# Patient Record
Sex: FEMALE | Race: WHITE | NOT HISPANIC OR LATINO
[De-identification: names, ages, dates, MRNs, and addresses within clinical notes are randomized per-mention and may not be internally consistent; named-entity substitution may affect disease eponyms.]

---

## 2017-03-20 ENCOUNTER — APPOINTMENT (OUTPATIENT)
Dept: UROLOGY | Facility: CLINIC | Age: 37
End: 2017-03-20

## 2018-03-12 ENCOUNTER — APPOINTMENT (OUTPATIENT)
Dept: HEMATOLOGY ONCOLOGY | Facility: CLINIC | Age: 38
End: 2018-03-12

## 2018-03-22 ENCOUNTER — APPOINTMENT (OUTPATIENT)
Dept: HEMATOLOGY ONCOLOGY | Facility: CLINIC | Age: 38
End: 2018-03-22

## 2018-03-22 VITALS
HEIGHT: 66 IN | HEART RATE: 71 BPM | SYSTOLIC BLOOD PRESSURE: 101 MMHG | RESPIRATION RATE: 14 BRPM | TEMPERATURE: 97.4 F | WEIGHT: 190 LBS | BODY MASS INDEX: 30.53 KG/M2 | DIASTOLIC BLOOD PRESSURE: 64 MMHG

## 2018-03-22 DIAGNOSIS — Z78.9 OTHER SPECIFIED HEALTH STATUS: ICD-10-CM

## 2018-03-22 DIAGNOSIS — Z87.19 PERSONAL HISTORY OF OTHER DISEASES OF THE DIGESTIVE SYSTEM: ICD-10-CM

## 2018-03-23 LAB
FERRITIN SERPL-MCNC: 6 NG/ML
TSH SERPL-ACNC: 1.99 UIU/ML

## 2018-03-26 ENCOUNTER — APPOINTMENT (OUTPATIENT)
Dept: INFUSION THERAPY | Facility: CLINIC | Age: 38
End: 2018-03-26

## 2018-03-26 LAB
ENDOMYSIUM IGA SER QL: NEGATIVE
ENDOMYSIUM IGA TITR SER: NORMAL
GASTRIN SERPL-MCNC: 27 PG/ML
GLIADIN IGA SER QL: <5 UNITS
GLIADIN IGG SER QL: 36.3 UNITS
GLIADIN PEPTIDE IGA SER-ACNC: NEGATIVE
GLIADIN PEPTIDE IGG SER-ACNC: POSITIVE

## 2018-03-26 RX ORDER — IRON SUCROSE 20 MG/ML
200 INJECTION, SOLUTION INTRAVENOUS ONCE
Qty: 0 | Refills: 0 | Status: COMPLETED | OUTPATIENT
Start: 2018-03-26 | End: 2018-03-26

## 2018-03-26 RX ADMIN — IRON SUCROSE 220 MILLIGRAM(S): 20 INJECTION, SOLUTION INTRAVENOUS at 11:20

## 2018-04-02 ENCOUNTER — APPOINTMENT (OUTPATIENT)
Dept: INFUSION THERAPY | Facility: CLINIC | Age: 38
End: 2018-04-02

## 2018-04-02 RX ORDER — IRON SUCROSE 20 MG/ML
200 INJECTION, SOLUTION INTRAVENOUS ONCE
Qty: 0 | Refills: 0 | Status: COMPLETED | OUTPATIENT
Start: 2018-04-02 | End: 2018-04-02

## 2018-04-02 RX ADMIN — IRON SUCROSE 220 MILLIGRAM(S): 20 INJECTION, SOLUTION INTRAVENOUS at 10:07

## 2018-04-09 ENCOUNTER — APPOINTMENT (OUTPATIENT)
Dept: INFUSION THERAPY | Facility: CLINIC | Age: 38
End: 2018-04-09

## 2018-04-09 RX ORDER — IRON SUCROSE 20 MG/ML
200 INJECTION, SOLUTION INTRAVENOUS ONCE
Qty: 0 | Refills: 0 | Status: COMPLETED | OUTPATIENT
Start: 2018-04-09 | End: 2018-04-09

## 2018-04-09 RX ADMIN — IRON SUCROSE 220 MILLIGRAM(S): 20 INJECTION, SOLUTION INTRAVENOUS at 10:21

## 2018-04-16 ENCOUNTER — APPOINTMENT (OUTPATIENT)
Dept: INFUSION THERAPY | Facility: CLINIC | Age: 38
End: 2018-04-16

## 2018-04-16 RX ORDER — IRON SUCROSE 20 MG/ML
200 INJECTION, SOLUTION INTRAVENOUS ONCE
Qty: 0 | Refills: 0 | Status: COMPLETED | OUTPATIENT
Start: 2018-04-16 | End: 2018-04-16

## 2018-04-16 RX ADMIN — IRON SUCROSE 220 MILLIGRAM(S): 20 INJECTION, SOLUTION INTRAVENOUS at 11:04

## 2018-04-23 ENCOUNTER — APPOINTMENT (OUTPATIENT)
Dept: INFUSION THERAPY | Facility: CLINIC | Age: 38
End: 2018-04-23

## 2018-04-23 RX ORDER — IRON SUCROSE 20 MG/ML
200 INJECTION, SOLUTION INTRAVENOUS ONCE
Qty: 0 | Refills: 0 | Status: COMPLETED | OUTPATIENT
Start: 2018-04-23 | End: 2018-04-23

## 2018-04-23 RX ADMIN — IRON SUCROSE 220 MILLIGRAM(S): 20 INJECTION, SOLUTION INTRAVENOUS at 10:42

## 2018-04-30 ENCOUNTER — APPOINTMENT (OUTPATIENT)
Dept: HEMATOLOGY ONCOLOGY | Facility: CLINIC | Age: 38
End: 2018-04-30

## 2018-04-30 VITALS
BODY MASS INDEX: 30.53 KG/M2 | WEIGHT: 190 LBS | DIASTOLIC BLOOD PRESSURE: 72 MMHG | HEIGHT: 66 IN | RESPIRATION RATE: 14 BRPM | HEART RATE: 70 BPM | TEMPERATURE: 97.5 F | SYSTOLIC BLOOD PRESSURE: 128 MMHG

## 2018-06-18 ENCOUNTER — APPOINTMENT (OUTPATIENT)
Dept: HEMATOLOGY ONCOLOGY | Facility: CLINIC | Age: 38
End: 2018-06-18

## 2018-06-18 ENCOUNTER — LABORATORY RESULT (OUTPATIENT)
Age: 38
End: 2018-06-18

## 2018-06-18 LAB
HCT VFR BLD CALC: 36.3 %
HGB BLD-MCNC: 12.2 G/DL
MCHC RBC-ENTMCNC: 29.2 PG
MCHC RBC-ENTMCNC: 33.6 G/DL
MCV RBC AUTO: 86.8 FL
PLATELET # BLD AUTO: 216 K/UL
PMV BLD: 8.7 FL
RBC # BLD: 4.18 M/UL
RBC # FLD: 15.5 %
WBC # FLD AUTO: 7.85 K/UL

## 2018-06-20 LAB — FERRITIN SERPL-MCNC: 41 NG/ML

## 2018-06-25 ENCOUNTER — APPOINTMENT (OUTPATIENT)
Dept: HEMATOLOGY ONCOLOGY | Facility: CLINIC | Age: 38
End: 2018-06-25

## 2018-06-25 ENCOUNTER — LABORATORY RESULT (OUTPATIENT)
Age: 38
End: 2018-06-25

## 2018-06-25 ENCOUNTER — APPOINTMENT (OUTPATIENT)
Dept: INFUSION THERAPY | Facility: CLINIC | Age: 38
End: 2018-06-25

## 2018-06-25 VITALS
SYSTOLIC BLOOD PRESSURE: 119 MMHG | TEMPERATURE: 97.6 F | BODY MASS INDEX: 31.82 KG/M2 | DIASTOLIC BLOOD PRESSURE: 83 MMHG | WEIGHT: 198 LBS | HEART RATE: 67 BPM | HEIGHT: 66 IN

## 2018-06-25 LAB
HCT VFR BLD CALC: 37.6 %
HGB BLD-MCNC: 12.7 G/DL
MCHC RBC-ENTMCNC: 29.7 PG
MCHC RBC-ENTMCNC: 33.8 G/DL
MCV RBC AUTO: 87.9 FL
PLATELET # BLD AUTO: 222 K/UL
PMV BLD: 8.6 FL
RBC # BLD: 4.28 M/UL
RBC # FLD: 15.2 %
WBC # FLD AUTO: 6.24 K/UL

## 2018-06-25 RX ORDER — IRON SUCROSE 20 MG/ML
200 INJECTION, SOLUTION INTRAVENOUS ONCE
Qty: 0 | Refills: 0 | Status: COMPLETED | OUTPATIENT
Start: 2018-06-25 | End: 2018-06-25

## 2018-06-25 RX ADMIN — IRON SUCROSE 220 MILLIGRAM(S): 20 INJECTION, SOLUTION INTRAVENOUS at 10:29

## 2018-06-27 LAB — FERRITIN SERPL-MCNC: 37 NG/ML

## 2018-08-31 ENCOUNTER — OUTPATIENT (OUTPATIENT)
Dept: OUTPATIENT SERVICES | Facility: HOSPITAL | Age: 38
LOS: 1 days | Discharge: HOME | End: 2018-08-31

## 2018-08-31 ENCOUNTER — APPOINTMENT (OUTPATIENT)
Dept: INFUSION THERAPY | Facility: CLINIC | Age: 38
End: 2018-08-31

## 2018-08-31 DIAGNOSIS — D50.9 IRON DEFICIENCY ANEMIA, UNSPECIFIED: ICD-10-CM

## 2018-08-31 RX ORDER — IRON SUCROSE 20 MG/ML
200 INJECTION, SOLUTION INTRAVENOUS ONCE
Qty: 0 | Refills: 0 | Status: COMPLETED | OUTPATIENT
Start: 2018-08-31 | End: 2018-08-31

## 2018-08-31 RX ADMIN — IRON SUCROSE 220 MILLIGRAM(S): 20 INJECTION, SOLUTION INTRAVENOUS at 09:33

## 2018-09-27 ENCOUNTER — LABORATORY RESULT (OUTPATIENT)
Age: 38
End: 2018-09-27

## 2018-09-27 ENCOUNTER — APPOINTMENT (OUTPATIENT)
Dept: HEMATOLOGY ONCOLOGY | Facility: CLINIC | Age: 38
End: 2018-09-27

## 2018-09-27 ENCOUNTER — APPOINTMENT (OUTPATIENT)
Dept: INFUSION THERAPY | Facility: CLINIC | Age: 38
End: 2018-09-27

## 2018-09-27 LAB
HCT VFR BLD CALC: 36.4 %
HGB BLD-MCNC: 12.5 G/DL
MCHC RBC-ENTMCNC: 31.2 PG
MCHC RBC-ENTMCNC: 34.3 G/DL
MCV RBC AUTO: 90.8 FL
PLATELET # BLD AUTO: 217 K/UL
PMV BLD: 8.6 FL
RBC # BLD: 4.01 M/UL
RBC # FLD: 12.2 %
WBC # FLD AUTO: 7.97 K/UL

## 2018-09-30 LAB
FERRITIN SERPL-MCNC: 68 NG/ML
IRON SATN MFR SERPL: 26 %
IRON SERPL-MCNC: 74 UG/DL
TIBC SERPL-MCNC: 285 UG/DL
UIBC SERPL-MCNC: 211 UG/DL

## 2018-10-01 ENCOUNTER — APPOINTMENT (OUTPATIENT)
Dept: HEMATOLOGY ONCOLOGY | Facility: CLINIC | Age: 38
End: 2018-10-01

## 2018-10-11 ENCOUNTER — APPOINTMENT (OUTPATIENT)
Dept: HEMATOLOGY ONCOLOGY | Facility: CLINIC | Age: 38
End: 2018-10-11

## 2018-10-11 VITALS
BODY MASS INDEX: 30.53 KG/M2 | DIASTOLIC BLOOD PRESSURE: 67 MMHG | TEMPERATURE: 96.1 F | HEIGHT: 66 IN | RESPIRATION RATE: 14 BRPM | HEART RATE: 67 BPM | SYSTOLIC BLOOD PRESSURE: 113 MMHG | WEIGHT: 190 LBS

## 2018-10-26 ENCOUNTER — APPOINTMENT (OUTPATIENT)
Dept: INFUSION THERAPY | Facility: CLINIC | Age: 38
End: 2018-10-26

## 2018-10-29 ENCOUNTER — APPOINTMENT (OUTPATIENT)
Dept: HEMATOLOGY ONCOLOGY | Facility: CLINIC | Age: 38
End: 2018-10-29

## 2018-11-23 ENCOUNTER — APPOINTMENT (OUTPATIENT)
Dept: INFUSION THERAPY | Facility: CLINIC | Age: 38
End: 2018-11-23

## 2018-11-23 RX ORDER — IRON SUCROSE 20 MG/ML
200 INJECTION, SOLUTION INTRAVENOUS ONCE
Qty: 0 | Refills: 0 | Status: COMPLETED | OUTPATIENT
Start: 2018-11-23 | End: 2018-11-23

## 2018-11-23 RX ADMIN — IRON SUCROSE 220 MILLIGRAM(S): 20 INJECTION, SOLUTION INTRAVENOUS at 08:31

## 2018-12-17 ENCOUNTER — APPOINTMENT (OUTPATIENT)
Dept: HEMATOLOGY ONCOLOGY | Facility: CLINIC | Age: 38
End: 2018-12-17

## 2019-02-15 ENCOUNTER — APPOINTMENT (OUTPATIENT)
Dept: INFUSION THERAPY | Facility: CLINIC | Age: 39
End: 2019-02-15

## 2019-02-15 ENCOUNTER — LABORATORY RESULT (OUTPATIENT)
Age: 39
End: 2019-02-15

## 2019-02-15 ENCOUNTER — OUTPATIENT (OUTPATIENT)
Dept: OUTPATIENT SERVICES | Facility: HOSPITAL | Age: 39
LOS: 1 days | Discharge: HOME | End: 2019-02-15

## 2019-02-15 DIAGNOSIS — D50.9 IRON DEFICIENCY ANEMIA, UNSPECIFIED: ICD-10-CM

## 2019-02-15 RX ORDER — IRON SUCROSE 20 MG/ML
200 INJECTION, SOLUTION INTRAVENOUS ONCE
Qty: 0 | Refills: 0 | Status: COMPLETED | OUTPATIENT
Start: 2019-02-15 | End: 2019-02-15

## 2019-02-15 RX ADMIN — IRON SUCROSE 220 MILLIGRAM(S): 20 INJECTION, SOLUTION INTRAVENOUS at 09:10

## 2019-02-19 LAB
FERRITIN SERPL-MCNC: 24 NG/ML
HCT VFR BLD CALC: 33.4 %
HGB BLD-MCNC: 11.4 G/DL
IRON SATN MFR SERPL: 14 %
IRON SERPL-MCNC: 46 UG/DL
MCHC RBC-ENTMCNC: 31.4 PG
MCHC RBC-ENTMCNC: 34.1 G/DL
MCV RBC AUTO: 92 FL
PLATELET # BLD AUTO: 221 K/UL
PMV BLD: 10 FL
RBC # BLD: 3.63 M/UL
RBC # FLD: 12.5 %
TIBC SERPL-MCNC: 322 UG/DL
UIBC SERPL-MCNC: 276 UG/DL
WBC # FLD AUTO: 7.77 K/UL

## 2019-03-05 ENCOUNTER — LABORATORY RESULT (OUTPATIENT)
Age: 39
End: 2019-03-05

## 2019-03-05 ENCOUNTER — APPOINTMENT (OUTPATIENT)
Dept: HEMATOLOGY ONCOLOGY | Facility: CLINIC | Age: 39
End: 2019-03-05

## 2019-03-05 VITALS
BODY MASS INDEX: 32.14 KG/M2 | DIASTOLIC BLOOD PRESSURE: 79 MMHG | SYSTOLIC BLOOD PRESSURE: 120 MMHG | WEIGHT: 200 LBS | HEIGHT: 66 IN | TEMPERATURE: 97.8 F | HEART RATE: 73 BPM | RESPIRATION RATE: 14 BRPM

## 2019-03-05 LAB
HCT VFR BLD CALC: 36.8 %
HGB BLD-MCNC: 12.5 G/DL
MCHC RBC-ENTMCNC: 31.1 PG
MCHC RBC-ENTMCNC: 34 G/DL
MCV RBC AUTO: 91.5 FL
PLATELET # BLD AUTO: 237 K/UL
PMV BLD: 9 FL
RBC # BLD: 4.02 M/UL
RBC # FLD: 12.2 %
WBC # FLD AUTO: 8.13 K/UL

## 2019-03-05 NOTE — PHYSICAL EXAM
[Normal] : normoactive bowel sounds, soft and nontender, no hepatosplenomegaly or masses appreciated
5

## 2019-03-06 LAB
ALBUMIN SERPL ELPH-MCNC: 4.2 G/DL
ALP BLD-CCNC: 62 U/L
ALT SERPL-CCNC: 30 U/L
ANION GAP SERPL CALC-SCNC: 12 MMOL/L
AST SERPL-CCNC: 25 U/L
BILIRUB SERPL-MCNC: 0.8 MG/DL
BUN SERPL-MCNC: 12 MG/DL
CALCIUM SERPL-MCNC: 9 MG/DL
CHLORIDE SERPL-SCNC: 104 MMOL/L
CO2 SERPL-SCNC: 22 MMOL/L
CREAT SERPL-MCNC: 0.8 MG/DL
FERRITIN SERPL-MCNC: 84 NG/ML
GLUCOSE SERPL-MCNC: 79 MG/DL
IRON SATN MFR SERPL: 40 %
IRON SERPL-MCNC: 122 UG/DL
POTASSIUM SERPL-SCNC: 4.5 MMOL/L
PROT SERPL-MCNC: 7.1 G/DL
SODIUM SERPL-SCNC: 138 MMOL/L
TIBC SERPL-MCNC: 306 UG/DL
UIBC SERPL-MCNC: 184 UG/DL

## 2019-03-06 NOTE — HISTORY OF PRESENT ILLNESS
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 ferritin : 5 .  she reports constipation from iron  . she feels fatigued and with mild dizziness. no SOB or craving for ice. No hematochezia. last EGD in January 2018   , colonoscopy ( result ?)  within 12 months. she denies heavy menses . [de-identified] : 06/25/2018 : Patient returns for follow up , she feels well , last Hb is normal , Ferritin :41 . She continues with heavy menses. \par \par 10/11/2018 : Patient returns for follow up , she reports occasional dizziness, she has less bleeding with menses. Last ferritin is up to 67 . Hb is normal . EGD was negative for celiac disease. \par \par 3/5/19:\par Doing well. No major complaints.\par Denies fever, nausea, vomiting, chest pain, SOB, abdominal pain, bowel and bladder problems.\par Not on PO iron.\par GI ()- has early barretts. Last endoscopy in 2018. No celiac disease on endoscopy as per pt. \par Normal menstrual cycles. \par

## 2019-03-06 NOTE — ASSESSMENT
[FreeTextEntry1] : \par \par 1) Iron deficiency anemia- s/p Iv iron - venofer :1000mg \par Not able to tolerate oral iron/ No improvement with Po iron therapy\par Improved Hb and ferritin\par Gliadin IgG positive, Normal TSH, denies steatorrhea, weight loss. EGD negative , \par \par follow up in 2 months with repeat cbc ferritin. will need maintenance venofer.

## 2019-03-18 ENCOUNTER — OUTPATIENT (OUTPATIENT)
Dept: OUTPATIENT SERVICES | Facility: HOSPITAL | Age: 39
LOS: 1 days | Discharge: HOME | End: 2019-03-18

## 2019-03-18 ENCOUNTER — TRANSCRIPTION ENCOUNTER (OUTPATIENT)
Age: 39
End: 2019-03-18

## 2019-03-18 DIAGNOSIS — Z00.00 ENCOUNTER FOR GENERAL ADULT MEDICAL EXAMINATION WITHOUT ABNORMAL FINDINGS: ICD-10-CM

## 2019-04-21 ENCOUNTER — EMERGENCY (EMERGENCY)
Facility: HOSPITAL | Age: 39
LOS: 0 days | Discharge: HOME | End: 2019-04-21
Attending: STUDENT IN AN ORGANIZED HEALTH CARE EDUCATION/TRAINING PROGRAM | Admitting: STUDENT IN AN ORGANIZED HEALTH CARE EDUCATION/TRAINING PROGRAM
Payer: COMMERCIAL

## 2019-04-21 VITALS
DIASTOLIC BLOOD PRESSURE: 112 MMHG | OXYGEN SATURATION: 98 % | HEART RATE: 69 BPM | TEMPERATURE: 98 F | RESPIRATION RATE: 18 BRPM

## 2019-04-21 VITALS — DIASTOLIC BLOOD PRESSURE: 67 MMHG | WEIGHT: 192.02 LBS | SYSTOLIC BLOOD PRESSURE: 112 MMHG | HEIGHT: 65 IN

## 2019-04-21 DIAGNOSIS — R51 HEADACHE: ICD-10-CM

## 2019-04-21 DIAGNOSIS — Z90.49 ACQUIRED ABSENCE OF OTHER SPECIFIED PARTS OF DIGESTIVE TRACT: ICD-10-CM

## 2019-04-21 DIAGNOSIS — Z87.891 PERSONAL HISTORY OF NICOTINE DEPENDENCE: ICD-10-CM

## 2019-04-21 DIAGNOSIS — K21.9 GASTRO-ESOPHAGEAL REFLUX DISEASE WITHOUT ESOPHAGITIS: ICD-10-CM

## 2019-04-21 DIAGNOSIS — Z90.49 ACQUIRED ABSENCE OF OTHER SPECIFIED PARTS OF DIGESTIVE TRACT: Chronic | ICD-10-CM

## 2019-04-21 DIAGNOSIS — Z98.891 HISTORY OF UTERINE SCAR FROM PREVIOUS SURGERY: Chronic | ICD-10-CM

## 2019-04-21 DIAGNOSIS — Z98.891 HISTORY OF UTERINE SCAR FROM PREVIOUS SURGERY: ICD-10-CM

## 2019-04-21 PROCEDURE — 99283 EMERGENCY DEPT VISIT LOW MDM: CPT | Mod: 25

## 2019-04-21 RX ORDER — RANITIDINE HYDROCHLORIDE 150 MG/1
0 TABLET, FILM COATED ORAL
Qty: 0 | Refills: 0 | COMMUNITY

## 2019-04-21 RX ORDER — OMEPRAZOLE 10 MG/1
0 CAPSULE, DELAYED RELEASE ORAL
Qty: 0 | Refills: 0 | COMMUNITY

## 2019-04-21 RX ORDER — VALACYCLOVIR 500 MG/1
2 TABLET, FILM COATED ORAL
Qty: 42 | Refills: 0 | OUTPATIENT
Start: 2019-04-21 | End: 2019-04-27

## 2019-04-21 RX ORDER — IRON DEXTRAN COMPLEX 100 MG/2ML
0 VIAL (ML) INJECTION
Qty: 0 | Refills: 0 | COMMUNITY

## 2019-04-21 NOTE — ED ADULT NURSE NOTE - PMH
GERD (gastroesophageal reflux disease)    Iron deficiency anemia, unspecified iron deficiency anemia type

## 2019-04-21 NOTE — ED PROVIDER NOTE - NS ED ROS FT
Constitutional:  See HPI  Eyes:  no visual changes, see HPI  ENMT: No neck pain or stiffness  Cardiac:  No chest pain  Respiratory:  No cough or respiratory distress.   GI:  No nausea, vomiting, diarrhea or abdominal pain.  :  No dysuria, frequency or burning.  MS:  No back pain.  Neuro:  See HPI   Skin:  No skin rash  Except as documented in the HPI,  all other systems are negative

## 2019-04-21 NOTE — ED PROVIDER NOTE - PHYSICAL EXAMINATION
CONSTITUTIONAL: NAD  SKIN: Warm dry  HEAD: NCAT  EYES: + mild swelling to upper eyelid w/o erythema; PERRL; EOMI; + mild scleral injection; Fluroceine stain no uptake  ENT: + herpetic type lesion on R lower lip; MMM; no nasal or ear lesions/ rash  NECK: Supple; non tender.  CARD: RRR  RESP: CTAB  ABD: S/NT no R/G  EXT: no pedal edema  NEURO: CN wnl; NL motor and sensory throughout  PSYCH: Cooperative, appropriate.

## 2019-04-21 NOTE — ED ADULT TRIAGE NOTE - CHIEF COMPLAINT QUOTE
woke up with swollen face and droop on the right side Thursday night, has hx of migraines denies numbness or tingling

## 2019-04-21 NOTE — ED ADULT NURSE NOTE - OBJECTIVE STATEMENT
patient presents for evaluation of right facial droop, right eye discomfort with lateral movement and right facial swelling x 3 days. noted with cold sore to right lower lip

## 2019-04-21 NOTE — ED PROVIDER NOTE - OBJECTIVE STATEMENT
37 y/o F pmh pmh GERD, anemia, here for eval R facial gradual onset, aching/ sharp, constant, non radiating pain x3d. + some eye lid swelling. + lip cold sore. + R sided pounding HA 3d ago, resolved after 2d. No fever, weakness, numbness, recent illness, travel, trauma, blurry vision, eye discharge

## 2019-04-21 NOTE — ED PROVIDER NOTE - CLINICAL SUMMARY MEDICAL DECISION MAKING FREE TEXT BOX
mm Pt w/ L facial pain. Ssx suggest zoster. diagnostic uncertainty discussed w/ pt, recc start acyclovir, pt agreed. understood need for fup and ssx for ED return. Pt stable for dc w/ PMD f/up, and care as discussed.  Pt/ family understands plan and signs and symptoms for ED return.

## 2019-04-21 NOTE — ED PROVIDER NOTE - NSFOLLOWUPINSTRUCTIONS_ED_ALL_ED_FT
Shingles    You were seen for facial pain. It may be shingles.  This diagnosis is uncertain, but you are receiving treatment for it.  Follow up with your doctor in 4-6 days if your symptoms do not improve.  For worsening or new symptoms, return the ER.    Shingles, which is also known as herpes zoster, is an infection that causes a painful skin rash and fluid-filled blisters. Shingles is not related to genital herpes, which is a sexually transmitted infection.     Shingles only develops in people who:    Have had chickenpox.  Have received the chickenpox vaccine. (This is rare.)    CAUSES  Shingles is caused by varicella-zoster virus (VZV). This is the same virus that causes chickenpox. After exposure to VZV, the virus stays in the body in an inactive (dormant) state. Shingles develops if the virus reactivates. This can happen many years after the initial exposure to VZV. It is not known what causes this virus to reactivate.    RISK FACTORS  People who have had chickenpox or received the chickenpox vaccine are at risk for shingles. Infection is more common in people who:    Are older than age 50.  Have a weakened defense (immune) system, such as those with HIV, AIDS, or cancer.  Are taking medicines that weaken the immune system, such as transplant medicines.  Are under great stress.    SYMPTOMS  Early symptoms of this condition include itching, tingling, and pain in an area on your skin. Pain may be described as burning, stabbing, or throbbing.    A few days or weeks after symptoms start, a painful red rash appears, usually on one side of the body in a bandlike or beltlike pattern. The rash eventually turns into fluid-filled blisters that break open, scab over, and dry up in about 2–3 weeks.    At any time during the infection, you may also develop:    A fever.  Chills.  A headache.  An upset stomach.    DIAGNOSIS  This condition is diagnosed with a skin exam. Sometimes, skin or fluid samples are taken from the blisters before a diagnosis is made. These samples are examined under a microscope or sent to a lab for testing.    TREATMENT  There is no specific cure for this condition. Your health care provider will probably prescribe medicines to help you manage pain, recover more quickly, and avoid long-term problems. Medicines may include:    Antiviral drugs.  Anti-inflammatory drugs.  Pain medicines.    If the area involved is on your face, you may be referred to a specialist, such as an eye doctor (ophthalmologist) or an ear, nose, and throat (ENT) doctor to help you avoid eye problems, chronic pain, or disability.    HOME CARE INSTRUCTIONS  Medicines    Take medicines only as directed by your health care provider.  Apply an anti-itch or numbing cream to the affected area as directed by your health care provider.    Blister and Rash Care    Take a cool bath or apply cool compresses to the area of the rash or blisters as directed by your health care provider. This may help with pain and itching.  Keep your rash covered with a loose bandage (dressing). Wear loose-fitting clothing to help ease the pain of material rubbing against the rash.  Keep your rash and blisters clean with mild soap and cool water or as directed by your health care provider.  Check your rash every day for signs of infection. These include redness, swelling, and pain that lasts or increases.  Do not pick your blisters.  Do not scratch your rash.    General Instructions    Rest as directed by your health care provider.  Keep all follow-up visits as directed by your health care provider. This is important.  Until your blisters scab over, your infection can cause chickenpox in people who have never had it or been vaccinated against it. To prevent this from happening, avoid contact with other people, especially:  Babies.  Pregnant women.  Children who have eczema.  Elderly people who have transplants.  People who have chronic illnesses, such as leukemia or AIDS.    SEEK MEDICAL CARE IF:  Your pain is not relieved with prescribed medicines.  Your pain does not get better after the rash heals.  Your rash looks infected. Signs of infection include redness, swelling, and pain that lasts or increases.    SEEK IMMEDIATE MEDICAL CARE IF:  The rash is on your face or nose.  You have facial pain, pain around your eye area, or loss of feeling on one side of your face.  You have ear pain or you have ringing in your ear.  You have loss of taste.  Your condition gets worse.    ADDITIONAL NOTES AND INSTRUCTIONS    Please follow up with your Primary MD in 24-48 hr.  Seek immediate medical care for any new/worsening signs or symptoms.

## 2019-05-23 ENCOUNTER — OUTPATIENT (OUTPATIENT)
Dept: OUTPATIENT SERVICES | Facility: HOSPITAL | Age: 39
LOS: 1 days | Discharge: HOME | End: 2019-05-23

## 2019-05-23 DIAGNOSIS — Z90.49 ACQUIRED ABSENCE OF OTHER SPECIFIED PARTS OF DIGESTIVE TRACT: Chronic | ICD-10-CM

## 2019-05-23 DIAGNOSIS — Z98.891 HISTORY OF UTERINE SCAR FROM PREVIOUS SURGERY: Chronic | ICD-10-CM

## 2019-05-23 DIAGNOSIS — R53.83 OTHER FATIGUE: ICD-10-CM

## 2019-05-23 DIAGNOSIS — D64.9 ANEMIA, UNSPECIFIED: ICD-10-CM

## 2019-05-23 PROBLEM — K21.9 GASTRO-ESOPHAGEAL REFLUX DISEASE WITHOUT ESOPHAGITIS: Chronic | Status: ACTIVE | Noted: 2019-04-21

## 2019-05-23 PROBLEM — D50.9 IRON DEFICIENCY ANEMIA, UNSPECIFIED: Chronic | Status: ACTIVE | Noted: 2019-04-21

## 2019-06-06 ENCOUNTER — LABORATORY RESULT (OUTPATIENT)
Age: 39
End: 2019-06-06

## 2019-06-06 ENCOUNTER — APPOINTMENT (OUTPATIENT)
Dept: HEMATOLOGY ONCOLOGY | Facility: CLINIC | Age: 39
End: 2019-06-06

## 2019-06-06 LAB
HCT VFR BLD CALC: 36.6 %
HGB BLD-MCNC: 12.5 G/DL
MCHC RBC-ENTMCNC: 31.3 PG
MCHC RBC-ENTMCNC: 34.2 G/DL
MCV RBC AUTO: 91.5 FL
PLATELET # BLD AUTO: 227 K/UL
PMV BLD: 8.7 FL
RBC # BLD: 4 M/UL
RBC # FLD: 12.9 %
WBC # FLD AUTO: 9.04 K/UL

## 2019-06-07 LAB — FERRITIN SERPL-MCNC: 27 NG/ML

## 2019-06-18 ENCOUNTER — APPOINTMENT (OUTPATIENT)
Dept: INFUSION THERAPY | Facility: CLINIC | Age: 39
End: 2019-06-18

## 2019-06-18 ENCOUNTER — OUTPATIENT (OUTPATIENT)
Dept: OUTPATIENT SERVICES | Facility: HOSPITAL | Age: 39
LOS: 1 days | Discharge: HOME | End: 2019-06-18

## 2019-06-18 DIAGNOSIS — D50.9 IRON DEFICIENCY ANEMIA, UNSPECIFIED: ICD-10-CM

## 2019-06-18 DIAGNOSIS — Z98.891 HISTORY OF UTERINE SCAR FROM PREVIOUS SURGERY: Chronic | ICD-10-CM

## 2019-06-18 DIAGNOSIS — Z90.49 ACQUIRED ABSENCE OF OTHER SPECIFIED PARTS OF DIGESTIVE TRACT: Chronic | ICD-10-CM

## 2019-06-18 RX ORDER — IRON SUCROSE 20 MG/ML
200 INJECTION, SOLUTION INTRAVENOUS ONCE
Refills: 0 | Status: COMPLETED | OUTPATIENT
Start: 2019-06-18 | End: 2019-06-18

## 2019-06-18 RX ADMIN — IRON SUCROSE 220 MILLIGRAM(S): 20 INJECTION, SOLUTION INTRAVENOUS at 08:45

## 2019-06-18 RX ADMIN — IRON SUCROSE 200 MILLIGRAM(S): 20 INJECTION, SOLUTION INTRAVENOUS at 09:15

## 2019-06-24 ENCOUNTER — APPOINTMENT (OUTPATIENT)
Dept: INFUSION THERAPY | Facility: CLINIC | Age: 39
End: 2019-06-24

## 2019-08-26 ENCOUNTER — LABORATORY RESULT (OUTPATIENT)
Age: 39
End: 2019-08-26

## 2019-08-26 ENCOUNTER — OUTPATIENT (OUTPATIENT)
Dept: OUTPATIENT SERVICES | Facility: HOSPITAL | Age: 39
LOS: 1 days | Discharge: HOME | End: 2019-08-26

## 2019-08-26 ENCOUNTER — APPOINTMENT (OUTPATIENT)
Dept: HEMATOLOGY ONCOLOGY | Facility: CLINIC | Age: 39
End: 2019-08-26
Payer: COMMERCIAL

## 2019-08-26 VITALS
HEIGHT: 66 IN | SYSTOLIC BLOOD PRESSURE: 119 MMHG | HEART RATE: 69 BPM | RESPIRATION RATE: 14 BRPM | TEMPERATURE: 97.4 F | BODY MASS INDEX: 30.53 KG/M2 | WEIGHT: 190 LBS | DIASTOLIC BLOOD PRESSURE: 77 MMHG

## 2019-08-26 DIAGNOSIS — Z98.891 HISTORY OF UTERINE SCAR FROM PREVIOUS SURGERY: Chronic | ICD-10-CM

## 2019-08-26 DIAGNOSIS — Z90.49 ACQUIRED ABSENCE OF OTHER SPECIFIED PARTS OF DIGESTIVE TRACT: Chronic | ICD-10-CM

## 2019-08-26 LAB
HCT VFR BLD CALC: 35.6 %
HGB BLD-MCNC: 12.4 G/DL
MCHC RBC-ENTMCNC: 31.5 PG
MCHC RBC-ENTMCNC: 34.8 G/DL
MCV RBC AUTO: 90.4 FL
PLATELET # BLD AUTO: 229 K/UL
PMV BLD: 8.7 FL
RBC # BLD: 3.94 M/UL
RBC # FLD: 12.3 %
WBC # FLD AUTO: 8.24 K/UL

## 2019-08-26 PROCEDURE — 99213 OFFICE O/P EST LOW 20 MIN: CPT

## 2019-08-27 LAB — FERRITIN SERPL-MCNC: 25 NG/ML

## 2019-08-30 DIAGNOSIS — Z86.2 PERSONAL HISTORY OF DISEASES OF THE BLOOD AND BLOOD-FORMING ORGANS AND CERTAIN DISORDERS INVOLVING THE IMMUNE MECHANISM: ICD-10-CM

## 2020-04-19 ENCOUNTER — TRANSCRIPTION ENCOUNTER (OUTPATIENT)
Age: 40
End: 2020-04-19

## 2020-04-25 ENCOUNTER — MESSAGE (OUTPATIENT)
Age: 40
End: 2020-04-25

## 2020-05-28 ENCOUNTER — TRANSCRIPTION ENCOUNTER (OUTPATIENT)
Age: 40
End: 2020-05-28

## 2022-01-04 ENCOUNTER — APPOINTMENT (OUTPATIENT)
Dept: HEMATOLOGY ONCOLOGY | Facility: CLINIC | Age: 42
End: 2022-01-04

## 2022-02-10 ENCOUNTER — APPOINTMENT (OUTPATIENT)
Dept: HEMATOLOGY ONCOLOGY | Facility: CLINIC | Age: 42
End: 2022-02-10
Payer: MEDICAID

## 2022-02-10 ENCOUNTER — LABORATORY RESULT (OUTPATIENT)
Age: 42
End: 2022-02-10

## 2022-02-10 VITALS
HEART RATE: 80 BPM | TEMPERATURE: 98.4 F | DIASTOLIC BLOOD PRESSURE: 63 MMHG | SYSTOLIC BLOOD PRESSURE: 112 MMHG | HEIGHT: 66 IN | OXYGEN SATURATION: 98 % | BODY MASS INDEX: 32.14 KG/M2 | WEIGHT: 200 LBS | RESPIRATION RATE: 14 BRPM

## 2022-02-10 DIAGNOSIS — Z00.00 ENCOUNTER FOR GENERAL ADULT MEDICAL EXAMINATION W/OUT ABNORMAL FINDINGS: ICD-10-CM

## 2022-02-10 LAB
HCT VFR BLD CALC: 28.8 %
HGB BLD-MCNC: 9.1 G/DL
MCHC RBC-ENTMCNC: 24.3 PG
MCHC RBC-ENTMCNC: 31.6 G/DL
MCV RBC AUTO: 77 FL
PLATELET # BLD AUTO: 252 K/UL
PMV BLD: 9.3 FL
RBC # BLD: 3.74 M/UL
RBC # FLD: 17.5 %
WBC # FLD AUTO: 7.35 K/UL

## 2022-02-10 PROCEDURE — 99214 OFFICE O/P EST MOD 30 MIN: CPT

## 2022-02-10 NOTE — ASSESSMENT
[FreeTextEntry1] : \par \par 1) History of Iron deficiency anemia- intolerant/refractory to po iron . responded to iron infusion in the past . \par Gliadin antibody IgG positive, no definite evidence of Celiac disease. \par negative GI work up except for GE reflux on therapy . \par 2) Post Covid 19 symptoms .\par  \par Recurrence of symptomatic  iron deficiency , Hgb : 9.1 \par Plan : resume venofer X 5 , check B12 , MMA , TSH , ferritin . \par          follow up in 2 months . \par

## 2022-02-10 NOTE — HISTORY OF PRESENT ILLNESS
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 ferritin : 5. she reports constipation from iron. she feels fatigued and with mild dizziness. no SOB or craving for ice. No hematochezia. last EGD in January 2018 , colonoscopy ( result ?) within 12 months. she denies heavy menses. \par \par \par  [de-identified] : 06/25/2018 : Patient returns for follow up , she feels well , last Hb is normal , Ferritin :41 . She continues with heavy menses. \par \par 10/11/2018 : Patient returns for follow up , she reports occasional dizziness, she has less bleeding with menses. Last ferritin is up to 67 . Hb is normal . EGD was negative for celiac disease. \par \par 2/10/2022 Patient returns after a long hiatus during which she contracted Covid 19 and has post-Covid symptoms including brain fog, fatigue , loss of taste , paresthesias . She had further GI work up including colonoscopy , EGD , capsule endoscopy ? and is on dual therapy for reflux symptoms . \par She follows at [place] post covid monitoring and found recently with iron deficiency . She continues with mild to moderate menstrual bleeding .

## 2022-02-10 NOTE — CONSULT LETTER
[Dear  ___] : Dear  [unfilled], [Consult Letter:] : I had the pleasure of evaluating your patient, [unfilled]. [Please see my note below.] : Please see my note below. [Sincerely,] : Sincerely, [FreeTextEntry3] : Dr Palomo

## 2022-02-10 NOTE — HISTORY OF PRESENT ILLNESS
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 ferritin : 5 .  she reports constipation from iron  . she feels fatigued and with mild dizziness. no SOB or craving for ice. No hematochezia. last EGD in January 2018   , colonoscopy ( result ?)  within 12 months. she denies heavy menses . [de-identified] : 06/25/2018 : Patient returns for follow up , she feels well , last Hb is normal , Ferritin :41 . She continues with heavy menses. \par \par 10/11/2018 : Patient returns for follow up , she reports occasional dizziness, she has less bleeding with menses. Last ferritin is up to 67 . Hb is normal . EGD was negative for celiac disease. \par \par 3/5/19:\par Doing well. No major complaints.\par Denies fever, nausea, vomiting, chest pain, SOB, abdominal pain, bowel and bladder problems.\par Not on PO iron.\par GI ()- has early barretts. Last endoscopy in 2018. No celiac disease on endoscopy as per pt. \par Normal menstrual cycles. \par \par 8/26/19\par Pt returns for a f/u visit. She has no fresh complaints. No bleeding or melena. Her last CBC in Jun 2019 was 12.5 with ferritin of 27 following which she had a dose of venofer. \par

## 2022-02-15 ENCOUNTER — TRANSCRIPTION ENCOUNTER (OUTPATIENT)
Age: 42
End: 2022-02-15

## 2022-02-15 VITALS — BODY MASS INDEX: 27.44 KG/M2 | WEIGHT: 170 LBS

## 2022-02-18 ENCOUNTER — APPOINTMENT (OUTPATIENT)
Dept: INFUSION THERAPY | Facility: CLINIC | Age: 42
End: 2022-02-18

## 2022-02-18 RX ORDER — IRON SUCROSE 20 MG/ML
200 INJECTION, SOLUTION INTRAVENOUS ONCE
Refills: 0 | Status: COMPLETED | OUTPATIENT
Start: 2022-02-18 | End: 2022-02-18

## 2022-02-18 RX ADMIN — IRON SUCROSE 220 MILLIGRAM(S): 20 INJECTION, SOLUTION INTRAVENOUS at 15:39

## 2022-02-22 LAB
ALBUMIN SERPL ELPH-MCNC: 4.3 G/DL
ALP BLD-CCNC: 60 U/L
ALT SERPL-CCNC: 8 U/L
ANION GAP SERPL CALC-SCNC: 11 MMOL/L
AST SERPL-CCNC: 14 U/L
BILIRUB SERPL-MCNC: 0.6 MG/DL
BUN SERPL-MCNC: 10 MG/DL
CALCIUM SERPL-MCNC: 8.8 MG/DL
CHLORIDE SERPL-SCNC: 105 MMOL/L
CO2 SERPL-SCNC: 21 MMOL/L
CREAT SERPL-MCNC: 0.7 MG/DL
FERRITIN SERPL-MCNC: 6 NG/ML
GLUCOSE SERPL-MCNC: 90 MG/DL
POTASSIUM SERPL-SCNC: 3.7 MMOL/L
PROT SERPL-MCNC: 7.4 G/DL
SODIUM SERPL-SCNC: 137 MMOL/L
TSH SERPL-ACNC: 1.63 UIU/ML
VIT B12 SERPL-MCNC: 754 PG/ML

## 2022-02-25 ENCOUNTER — APPOINTMENT (OUTPATIENT)
Dept: INFUSION THERAPY | Facility: CLINIC | Age: 42
End: 2022-02-25

## 2022-02-25 VITALS
HEART RATE: 72 BPM | DIASTOLIC BLOOD PRESSURE: 53 MMHG | RESPIRATION RATE: 16 BRPM | TEMPERATURE: 97.9 F | SYSTOLIC BLOOD PRESSURE: 106 MMHG

## 2022-02-25 RX ORDER — IRON SUCROSE 20 MG/ML
200 INJECTION, SOLUTION INTRAVENOUS ONCE
Refills: 0 | Status: COMPLETED | OUTPATIENT
Start: 2022-02-25 | End: 2022-02-25

## 2022-02-25 RX ADMIN — IRON SUCROSE 200 MILLIGRAM(S): 20 INJECTION, SOLUTION INTRAVENOUS at 14:40

## 2022-02-25 RX ADMIN — IRON SUCROSE 220 MILLIGRAM(S): 20 INJECTION, SOLUTION INTRAVENOUS at 14:06

## 2022-03-04 ENCOUNTER — APPOINTMENT (OUTPATIENT)
Dept: INFUSION THERAPY | Facility: CLINIC | Age: 42
End: 2022-03-04

## 2022-03-04 VITALS
SYSTOLIC BLOOD PRESSURE: 102 MMHG | TEMPERATURE: 98.1 F | RESPIRATION RATE: 16 BRPM | HEART RATE: 65 BPM | DIASTOLIC BLOOD PRESSURE: 52 MMHG

## 2022-03-04 RX ORDER — IRON SUCROSE 20 MG/ML
200 INJECTION, SOLUTION INTRAVENOUS ONCE
Refills: 0 | Status: COMPLETED | OUTPATIENT
Start: 2022-03-04 | End: 2022-03-04

## 2022-03-04 RX ADMIN — IRON SUCROSE 110 MILLIGRAM(S): 20 INJECTION, SOLUTION INTRAVENOUS at 14:46

## 2022-03-04 RX ADMIN — IRON SUCROSE 200 MILLIGRAM(S): 20 INJECTION, SOLUTION INTRAVENOUS at 15:20

## 2022-03-05 LAB
ANION GAP SERPL CALC-SCNC: 8 MMOL/L
BUN SERPL-MCNC: 8 MG/DL
CALCIUM SERPL-MCNC: 9 MG/DL
CHLORIDE SERPL-SCNC: 110 MMOL/L
CO2 SERPL-SCNC: 21 MMOL/L
CREAT SERPL-MCNC: 0.7 MG/DL
EGFR: 110 ML/MIN/1.73M2
GLUCOSE SERPL-MCNC: 86 MG/DL
POTASSIUM SERPL-SCNC: 3.9 MMOL/L
SODIUM SERPL-SCNC: 139 MMOL/L
TSH SERPL-ACNC: 1.61 UIU/ML

## 2022-03-07 LAB
FERRITIN SERPL-MCNC: 95 NG/ML
VIT B12 SERPL-MCNC: 680 PG/ML

## 2022-03-11 ENCOUNTER — APPOINTMENT (OUTPATIENT)
Dept: INFUSION THERAPY | Facility: CLINIC | Age: 42
End: 2022-03-11

## 2022-03-11 RX ORDER — IRON SUCROSE 20 MG/ML
200 INJECTION, SOLUTION INTRAVENOUS ONCE
Refills: 0 | Status: COMPLETED | OUTPATIENT
Start: 2022-03-11 | End: 2022-03-11

## 2022-03-11 RX ADMIN — IRON SUCROSE 110 MILLIGRAM(S): 20 INJECTION, SOLUTION INTRAVENOUS at 13:57

## 2022-03-12 LAB
ALBUMIN SERPL ELPH-MCNC: 4.4 G/DL
ALP BLD-CCNC: 56 U/L
ALT SERPL-CCNC: 12 U/L
ANION GAP SERPL CALC-SCNC: 12 MMOL/L
AST SERPL-CCNC: 14 U/L
BILIRUB SERPL-MCNC: 0.8 MG/DL
BUN SERPL-MCNC: 11 MG/DL
CALCIUM SERPL-MCNC: 9.1 MG/DL
CHLORIDE SERPL-SCNC: 108 MMOL/L
CO2 SERPL-SCNC: 19 MMOL/L
CREAT SERPL-MCNC: 0.8 MG/DL
EGFR: 94 ML/MIN/1.73M2
FERRITIN SERPL-MCNC: 145 NG/ML
GLUCOSE SERPL-MCNC: 82 MG/DL
POTASSIUM SERPL-SCNC: 4.3 MMOL/L
PROT SERPL-MCNC: 7.4 G/DL
SODIUM SERPL-SCNC: 139 MMOL/L
TSH SERPL-ACNC: 1.97 UIU/ML
VIT B12 SERPL-MCNC: 663 PG/ML

## 2022-03-18 ENCOUNTER — APPOINTMENT (OUTPATIENT)
Dept: INFUSION THERAPY | Facility: CLINIC | Age: 42
End: 2022-03-18

## 2022-03-18 RX ORDER — IRON SUCROSE 20 MG/ML
200 INJECTION, SOLUTION INTRAVENOUS ONCE
Refills: 0 | Status: COMPLETED | OUTPATIENT
Start: 2022-03-18 | End: 2022-03-18

## 2022-03-18 RX ADMIN — IRON SUCROSE 110 MILLIGRAM(S): 20 INJECTION, SOLUTION INTRAVENOUS at 14:15

## 2022-04-14 ENCOUNTER — OUTPATIENT (OUTPATIENT)
Dept: OUTPATIENT SERVICES | Facility: HOSPITAL | Age: 42
LOS: 1 days | Discharge: HOME | End: 2022-04-14

## 2022-04-14 ENCOUNTER — APPOINTMENT (OUTPATIENT)
Dept: HEMATOLOGY ONCOLOGY | Facility: CLINIC | Age: 42
End: 2022-04-14
Payer: MEDICAID

## 2022-04-14 ENCOUNTER — APPOINTMENT (OUTPATIENT)
Dept: HEMATOLOGY ONCOLOGY | Facility: CLINIC | Age: 42
End: 2022-04-14

## 2022-04-14 ENCOUNTER — LABORATORY RESULT (OUTPATIENT)
Age: 42
End: 2022-04-14

## 2022-04-14 VITALS
HEART RATE: 75 BPM | HEIGHT: 66 IN | OXYGEN SATURATION: 98 % | BODY MASS INDEX: 27 KG/M2 | DIASTOLIC BLOOD PRESSURE: 66 MMHG | SYSTOLIC BLOOD PRESSURE: 124 MMHG | WEIGHT: 168 LBS | TEMPERATURE: 97.1 F | RESPIRATION RATE: 16 BRPM

## 2022-04-14 DIAGNOSIS — Z90.49 ACQUIRED ABSENCE OF OTHER SPECIFIED PARTS OF DIGESTIVE TRACT: Chronic | ICD-10-CM

## 2022-04-14 DIAGNOSIS — Z87.19 PERSONAL HISTORY OF OTHER DISEASES OF THE DIGESTIVE SYSTEM: ICD-10-CM

## 2022-04-14 DIAGNOSIS — Z98.891 HISTORY OF UTERINE SCAR FROM PREVIOUS SURGERY: Chronic | ICD-10-CM

## 2022-04-14 DIAGNOSIS — Z86.2 PERSONAL HISTORY OF DISEASES OF THE BLOOD AND BLOOD-FORMING ORGANS AND CERTAIN DISORDERS INVOLVING THE IMMUNE MECHANISM: ICD-10-CM

## 2022-04-14 PROCEDURE — 99213 OFFICE O/P EST LOW 20 MIN: CPT

## 2022-04-14 NOTE — PHYSICAL EXAM
[Normal] : normoactive bowel sounds, soft and nontender, no hepatosplenomegaly or masses appreciated [Restricted in physically strenuous activity but ambulatory and able to carry out work of a light or sedentary nature] : Status 1- Restricted in physically strenuous activity but ambulatory and able to carry out work of a light or sedentary nature, e.g., light house work, office work

## 2022-04-15 LAB
FERRITIN SERPL-MCNC: 95 NG/ML
HCT VFR BLD CALC: 35.8 %
HGB BLD-MCNC: 11.9 G/DL
IRON SATN MFR SERPL: 22 %
IRON SERPL-MCNC: 66 UG/DL
MCHC RBC-ENTMCNC: 28.9 PG
MCHC RBC-ENTMCNC: 33.2 G/DL
MCV RBC AUTO: 86.9 FL
PLATELET # BLD AUTO: 203 K/UL
PMV BLD: 8.6 FL
RBC # BLD: 4.12 M/UL
RBC # FLD: 20.1 %
TIBC SERPL-MCNC: 297 UG/DL
UIBC SERPL-MCNC: 231 UG/DL
WBC # FLD AUTO: 6.69 K/UL

## 2022-04-16 NOTE — HISTORY OF PRESENT ILLNESS
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 ferritin : 5. she reports constipation from iron. she feels fatigued and with mild dizziness. no SOB or craving for ice. No hematochezia. last EGD in January 2018 , colonoscopy ( result ?) within 12 months. she denies heavy menses. \par \par \par  [de-identified] : 06/25/2018 : Patient returns for follow up , she feels well , last Hb is normal , Ferritin :41 . She continues with heavy menses. \par \par 10/11/2018 : Patient returns for follow up , she reports occasional dizziness, she has less bleeding with menses. Last ferritin is up to 67 . Hb is normal . EGD was negative for celiac disease. \par \par 2/10/2022 Patient returns after a long hiatus during which she contracted Covid 19 and has post-Covid symptoms including brain fog, fatigue , loss of taste , paresthesias . She had further GI work up including colonoscopy , EGD , capsule endoscopy ? and is on dual therapy for reflux symptoms . \par She follows at [place] post covid monitoring and found recently with iron deficiency . She continues with mild to moderate menstrual bleeding . \par 4/14/22:\par  Patient came for a follow up for WILFRID, S/P 5 doses of Venofer infusion.  CBC improved with HGB is 11.9/ 35.8. Ferritin level also improved \par  Patient reports she still feels weak during the day after home daily duties.\par She still experienced with  post- Covid symptoms including brain fog, fatigue , loss of taste , paresthesias . She \par She follows at [place] post covid monitoring and found recently with iron deficiency . She continues with mild to moderate menstrual bleeding . \par

## 2022-04-16 NOTE — ASSESSMENT
[FreeTextEntry1] : \par \par 1) History of Iron deficiency anemia- intolerant/refractory to po iron . responded to iron infusion in the past . \par Gliadin antibody IgG positive, no definite evidence of Celiac disease. \par negative GI work up except for GE reflux on therapy . \par 2) Post Covid 19 symptoms .\par  \par Recurrence of symptomatic  iron deficiency ,  Today Hgb 11.9 \par Plan : S/P  Venofer X 5 , check B12 , MMA , TSH , ferritin . \par         Patient will remain on close observation.\par         \par          follow up in 3 months . \par  Patient seen and examined with Dr. Palomo who agreed for the above plan of care. \par \par

## 2022-04-19 DIAGNOSIS — Z87.19 PERSONAL HISTORY OF OTHER DISEASES OF THE DIGESTIVE SYSTEM: ICD-10-CM

## 2022-08-25 ENCOUNTER — RX RENEWAL (OUTPATIENT)
Age: 42
End: 2022-08-25

## 2022-08-29 ENCOUNTER — APPOINTMENT (OUTPATIENT)
Dept: HEMATOLOGY ONCOLOGY | Facility: CLINIC | Age: 42
End: 2022-08-29

## 2022-09-15 RX ORDER — FREMANEZUMAB-VFRM 225 MG/1.5ML
225 INJECTION SUBCUTANEOUS
Qty: 1.5 | Refills: 0 | Status: ACTIVE | COMMUNITY
Start: 2022-09-15

## 2022-10-06 ENCOUNTER — APPOINTMENT (OUTPATIENT)
Dept: HEMATOLOGY ONCOLOGY | Facility: CLINIC | Age: 42
End: 2022-10-06

## 2022-10-06 ENCOUNTER — LABORATORY RESULT (OUTPATIENT)
Age: 42
End: 2022-10-06

## 2022-10-06 VITALS
HEART RATE: 77 BPM | RESPIRATION RATE: 16 BRPM | DIASTOLIC BLOOD PRESSURE: 55 MMHG | HEIGHT: 66 IN | TEMPERATURE: 99.1 F | WEIGHT: 164 LBS | OXYGEN SATURATION: 98 % | SYSTOLIC BLOOD PRESSURE: 111 MMHG | BODY MASS INDEX: 26.36 KG/M2

## 2022-10-06 PROCEDURE — 99214 OFFICE O/P EST MOD 30 MIN: CPT

## 2022-10-06 NOTE — ASSESSMENT
[FreeTextEntry1] : \par \par 1) History of Iron deficiency anemia- intolerant/refractory to po iron . responded to iron infusion in the past . \par Gliadin antibody IgG positive, no definite evidence of Celiac disease. \par negative GI work up except for GE reflux on therapy . no capsule endoscopy . currently on zantac only . \par 2) Post Covid 19 symptoms .\par  \par S/P venofer in 3/2022 \par Plan : repeat CBC , ferritin . follow up in 3 months .

## 2022-10-06 NOTE — HISTORY OF PRESENT ILLNESS
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 ferritin : 5. she reports constipation from iron. she feels fatigued and with mild dizziness. no SOB or craving for ice. No hematochezia. last EGD in January 2018 , colonoscopy ( result ?) within 12 months. she denies heavy menses. \par \par \par  [de-identified] : 06/25/2018 : Patient returns for follow up , she feels well , last Hb is normal , Ferritin :41 . She continues with heavy menses. \par \par 10/11/2018 : Patient returns for follow up , she reports occasional dizziness, she has less bleeding with menses. Last ferritin is up to 67 . Hb is normal . EGD was negative for celiac disease. \par \par 2/10/2022 Patient returns after a long hiatus during which she contracted Covid 19 and has post-Covid symptoms including brain fog, fatigue , loss of taste , paresthesias . She had further GI work up including colonoscopy , EGD , capsule endoscopy ? and is on dual therapy for reflux symptoms . \par She follows at [place] post covid monitoring and found recently with iron deficiency . She continues with mild to moderate menstrual bleeding . \par \par 10/6/2022 patient returns after venofer for iron deficiency , she feels well , no hematochezia , denies heavy menses , she is currently on ranitidine only .

## 2022-10-07 LAB — FERRITIN SERPL-MCNC: 9 NG/ML

## 2022-10-13 ENCOUNTER — APPOINTMENT (OUTPATIENT)
Dept: NEUROLOGY | Facility: CLINIC | Age: 42
End: 2022-10-13

## 2022-10-13 VITALS
DIASTOLIC BLOOD PRESSURE: 67 MMHG | HEART RATE: 66 BPM | BODY MASS INDEX: 26.36 KG/M2 | WEIGHT: 164 LBS | SYSTOLIC BLOOD PRESSURE: 108 MMHG | HEIGHT: 66 IN

## 2022-10-13 PROCEDURE — 99213 OFFICE O/P EST LOW 20 MIN: CPT | Mod: ACP

## 2022-10-13 PROCEDURE — 99072 ADDL SUPL MATRL&STAF TM PHE: CPT | Mod: ACP

## 2022-10-13 RX ORDER — FREMANEZUMAB-VFRM 225 MG/1.5ML
225 INJECTION SUBCUTANEOUS
Qty: 1 | Refills: 3 | Status: ACTIVE | COMMUNITY
Start: 2022-10-13 | End: 1900-01-01

## 2022-10-13 NOTE — REVIEW OF SYSTEMS
[Numbness] : numbness [Tingling] : tingling [Migraine Headache] : migraine headaches [Tension Headache] : tension-type headaches [Negative] : Cardiovascular

## 2022-10-13 NOTE — ASSESSMENT
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 She will continue to use Ajovy 225mg/1.5 ml injection monthly for prevention and we will try and get this approved through  portal. She will continue to use Imitrex 100 mg as needed for prevention and will f/u in 6-8 weeks for re evaluation. She is aware if there are any issues she can contact the office.\par \par I personally reviewed with the PA, this patient's history and physical exam findings, as documented above. I have discussed the relevant areas of concern, having direct implications to the presenting problems and illnesses, and I have personally examined all pertinent and positive and negative findings, which impact on the prior neurological treatment. \par \par \par Michelle Lima, MS, PA-C\par Kb Martins, \par

## 2022-10-13 NOTE — HISTORY OF PRESENT ILLNESS
[FreeTextEntry1] : 41-year-old female presents for post COVID syndrome from a COVID-19 infection sustained at work.\par \par TODAY:  I had the pleasure of seeing Ms. Powell today in follow up. Her previous history and physical findings have been reviewed.\par \par She is under our care for chronic migraines and lower extremity parasthesias due to post covid syndrome which she is receiving continuing active treatment for. She was doing better with respect to Ajovy 225mg/1.5 ml monthly injection for migraine prevention but unfortunately never received approval from her insurance and has been out of it for 4 months. She states her migraines now occur 4-5 times a week and she will use Imitrex 100 mg but is not able to take it that often. She was previously on a regimen of Topamax and gabapentin as well as doxepin but was not effective.  The Ajovy was allowing her to function and perform ADLs and it is medically necessary she obtain this at this time.  We will try and submit for it again today.\par \par Of note she tried to return to light duty but her job was unable to accommodate that request and therefore she remains totally disabled .

## 2022-10-13 NOTE — PHYSICAL EXAM
[General Appearance - Alert] : alert [General Appearance - In No Acute Distress] : in no acute distress [General Appearance - Well Nourished] : well nourished [General Appearance - Well Developed] : well developed [General Appearance - Well-Appearing] : healthy appearing [Oriented To Time, Place, And Person] : oriented to person, place, and time [Affect] : the affect was normal [Mood] : the mood was normal [Memory Recent] : recent memory was not impaired [Memory Remote] : remote memory was not impaired [Person] : oriented to person [Place] : oriented to place [Time] : oriented to time [Short Term Intact] : short term memory intact [Remote Intact] : remote memory intact [Registration Intact] : recent registration memory intact [Cranial Nerves Optic (II)] : visual acuity intact bilaterally,  visual fields full to confrontation, pupils equal round and reactive to light [Cranial Nerves Oculomotor (III)] : extraocular motion intact [Cranial Nerves Facial (VII)] : face symmetrical [Motor Tone] : muscle tone was normal in all four extremities [Motor Strength] : muscle strength was normal in all four extremities [Involuntary Movements] : no involuntary movements were seen [FreeTextEntry1] : Mental Status: Patient is a good informant with intact orientation, attention, concentration, recent and remote memory. Language evaluation reveals no evidence of aphasia. Fund of knowledge is normal.   II, III, IV, VI: Pupils are equal, round, and reactive to light and accommodation. Visual fields are full to confrontation. Eye movements are full without evidence of nystagmus or internuclear ophthalmoplegia.   V: Normal jaw movements. Normal facial sensation.   VII: Normal facial motor testing.

## 2022-10-20 ENCOUNTER — APPOINTMENT (OUTPATIENT)
Dept: INFUSION THERAPY | Facility: CLINIC | Age: 42
End: 2022-10-20

## 2022-10-20 RX ORDER — HYDROCORTISONE 20 MG
100 TABLET ORAL ONCE
Refills: 0 | Status: COMPLETED | OUTPATIENT
Start: 2022-10-20 | End: 2022-10-20

## 2022-10-20 RX ORDER — ACETAMINOPHEN 500 MG
650 TABLET ORAL ONCE
Refills: 0 | Status: COMPLETED | OUTPATIENT
Start: 2022-10-20 | End: 2022-10-20

## 2022-10-20 RX ORDER — FERUMOXYTOL 510 MG/17ML
510 INJECTION INTRAVENOUS ONCE
Refills: 0 | Status: COMPLETED | OUTPATIENT
Start: 2022-10-20 | End: 2022-10-20

## 2022-10-20 RX ADMIN — Medication 100 MILLIGRAM(S): at 16:19

## 2022-10-20 RX ADMIN — Medication 650 MILLIGRAM(S): at 16:19

## 2022-10-20 RX ADMIN — FERUMOXYTOL 156 MILLIGRAM(S): 510 INJECTION INTRAVENOUS at 16:40

## 2022-10-27 ENCOUNTER — OUTPATIENT (OUTPATIENT)
Dept: OUTPATIENT SERVICES | Facility: HOSPITAL | Age: 42
LOS: 1 days | End: 2022-10-27

## 2022-10-27 ENCOUNTER — APPOINTMENT (OUTPATIENT)
Dept: INFUSION THERAPY | Facility: CLINIC | Age: 42
End: 2022-10-27

## 2022-10-27 DIAGNOSIS — Z98.891 HISTORY OF UTERINE SCAR FROM PREVIOUS SURGERY: Chronic | ICD-10-CM

## 2022-10-27 DIAGNOSIS — Z90.49 ACQUIRED ABSENCE OF OTHER SPECIFIED PARTS OF DIGESTIVE TRACT: Chronic | ICD-10-CM

## 2022-10-27 DIAGNOSIS — Z86.2 PERSONAL HISTORY OF DISEASES OF THE BLOOD AND BLOOD-FORMING ORGANS AND CERTAIN DISORDERS INVOLVING THE IMMUNE MECHANISM: ICD-10-CM

## 2022-10-27 RX ORDER — HYDROCORTISONE 20 MG
100 TABLET ORAL ONCE
Refills: 0 | Status: COMPLETED | OUTPATIENT
Start: 2022-10-27 | End: 2022-10-27

## 2022-10-27 RX ORDER — FERUMOXYTOL 510 MG/17ML
510 INJECTION INTRAVENOUS ONCE
Refills: 0 | Status: COMPLETED | OUTPATIENT
Start: 2022-10-27 | End: 2022-10-27

## 2022-10-27 RX ORDER — ACETAMINOPHEN 500 MG
650 TABLET ORAL ONCE
Refills: 0 | Status: COMPLETED | OUTPATIENT
Start: 2022-10-27 | End: 2022-10-27

## 2022-10-27 RX ADMIN — Medication 100 MILLIGRAM(S): at 14:38

## 2022-10-27 RX ADMIN — Medication 650 MILLIGRAM(S): at 14:37

## 2022-10-27 RX ADMIN — FERUMOXYTOL 156 MILLIGRAM(S): 510 INJECTION INTRAVENOUS at 14:38

## 2022-11-28 ENCOUNTER — NON-APPOINTMENT (OUTPATIENT)
Age: 42
End: 2022-11-28

## 2022-11-28 ENCOUNTER — APPOINTMENT (OUTPATIENT)
Dept: OBGYN | Facility: CLINIC | Age: 42
End: 2022-11-28

## 2022-11-28 VITALS
HEART RATE: 73 BPM | SYSTOLIC BLOOD PRESSURE: 115 MMHG | WEIGHT: 160 LBS | HEIGHT: 66 IN | BODY MASS INDEX: 25.71 KG/M2 | DIASTOLIC BLOOD PRESSURE: 80 MMHG

## 2022-11-28 DIAGNOSIS — Z12.39 ENCOUNTER FOR OTHER SCREENING FOR MALIGNANT NEOPLASM OF BREAST: ICD-10-CM

## 2022-11-28 DIAGNOSIS — Z83.3 FAMILY HISTORY OF DIABETES MELLITUS: ICD-10-CM

## 2022-11-28 DIAGNOSIS — Z01.419 ENCOUNTER FOR GYNECOLOGICAL EXAMINATION (GENERAL) (ROUTINE) W/OUT ABNORMAL FINDINGS: ICD-10-CM

## 2022-11-28 LAB
BILIRUB UR QL STRIP: NEGATIVE
CLARITY UR: CLEAR
COLLECTION METHOD: NORMAL
GLUCOSE UR-MCNC: NEGATIVE
HCG UR QL: 0.2 EU/DL
HGB UR QL STRIP.AUTO: NORMAL
KETONES UR-MCNC: NEGATIVE
LEUKOCYTE ESTERASE UR QL STRIP: NEGATIVE
NITRITE UR QL STRIP: NEGATIVE
PH UR STRIP: 7
PROT UR STRIP-MCNC: NEGATIVE
SP GR UR STRIP: 1.01

## 2022-11-28 PROCEDURE — 81003 URINALYSIS AUTO W/O SCOPE: CPT | Mod: QW

## 2022-11-28 PROCEDURE — 99386 PREV VISIT NEW AGE 40-64: CPT

## 2022-11-28 NOTE — HISTORY OF PRESENT ILLNESS
[FreeTextEntry1] : new pt \par annual visit \par no complaints [N] : Patient reports normal menses [Withdrawal] : uses withdrawal [Monogamous (Male Partner)] : is monogamous with a male partner [Y] : Positive pregnancy history [LMPDate] : 11/14/22 [MensesFreq] : 28 [MensesLength] : 5 [MensesAmount] : mod [de-identified] :  [PGHxTotal] : 2 [[facility] : 2 [Regular Cycle Intervals] : periods have been regular [Menarche Age: ____] : age at menarche was [unfilled] [Currently Active] : currently active [Men] : men [Vaginal] : vaginal [No] : No

## 2022-11-30 LAB — CYTOLOGY CVX/VAG DOC THIN PREP: NORMAL

## 2022-12-05 LAB
C TRACH RRNA SPEC QL NAA+PROBE: NOT DETECTED
HPV HIGH+LOW RISK DNA PNL CVX: NOT DETECTED
N GONORRHOEA RRNA SPEC QL NAA+PROBE: NOT DETECTED
SOURCE AMPLIFICATION: NORMAL

## 2022-12-06 ENCOUNTER — APPOINTMENT (OUTPATIENT)
Dept: NEUROLOGY | Facility: CLINIC | Age: 42
End: 2022-12-06

## 2022-12-06 VITALS
HEART RATE: 72 BPM | SYSTOLIC BLOOD PRESSURE: 123 MMHG | HEIGHT: 66 IN | BODY MASS INDEX: 25.71 KG/M2 | WEIGHT: 160 LBS | DIASTOLIC BLOOD PRESSURE: 78 MMHG

## 2022-12-06 PROCEDURE — 99214 OFFICE O/P EST MOD 30 MIN: CPT

## 2022-12-06 PROCEDURE — 99072 ADDL SUPL MATRL&STAF TM PHE: CPT

## 2022-12-06 NOTE — PHYSICAL EXAM
[Person] : oriented to person [Place] : oriented to place [Time] : oriented to time [Short Term Intact] : short term memory intact [Remote Intact] : remote memory intact [Registration Intact] : recent registration memory intact [Cranial Nerves Optic (II)] : visual acuity intact bilaterally,  visual fields full to confrontation, pupils equal round and reactive to light [Cranial Nerves Oculomotor (III)] : extraocular motion intact [Cranial Nerves Facial (VII)] : face symmetrical [Motor Tone] : muscle tone was normal in all four extremities [Motor Strength] : muscle strength was normal in all four extremities [Involuntary Movements] : no involuntary movements were seen

## 2022-12-06 NOTE — ASSESSMENT
[FreeTextEntry1] :  since we have been treating her for over 2 years without any clinical improvement.  I recommend following up with a headache specialist to see if anything else can be done.  In the meantime I recommended increasing topiramate to 300 mg daily and after 2 weeks if there is no  clinical improvement and no side effects, can increase to 400 mg daily.   for abortive therapy I recommend combining sumatriptan with NSAIDs or a trial of Maxalt\par \par  she is consider temporarily totally disabled and not recommended to go back to work at this time.

## 2022-12-06 NOTE — HISTORY OF PRESENT ILLNESS
Patient: Justo Robert Date: 2022   : 1936    85 year old male Preferred name: Doug     INPATIENT WOUND CARE PROGRESS NOTE Re-Consult     Supervising Wound Care / Hyperbaric Medicine Physician: Dr. Jian Darby   Consulting Provider:  TRISH Mariano  Date of Consultation/Last Comprehensive Exam:  2017  Referring  Provider:  Dr. Barrett Alvarado    SUBJECTIVE:    Chief Complaint:  BLE lymphedema    Wound/Ulcer Present:    Venous leg ulcer:  Current Vascular Assessment:  Physical exam.   Current Compression Therapy:  Visco/cotton/coban    Additional Wound Category:  Other, Lymphedema     Maximum Baseline Ambulatory Status:  Ambulates with assistance    History of Present Illness: This is a 85 year old non-diabetic male with history of PAD, hypertension and obesity that is followed chronically by our clinic secondary to lower extremity edema and LLE weeping.  We have intermittedly seen him for this same issue at that time in compression wraps but has a known history of noncompliance with lymphedema management (has refused referral to lymphedema clinic, along with noncompliance with compression therapy).     He has had issues with lower extremity swelling since at least . Last seen by our service in the outpatient wound clinic by Dr. Garcia on 3/29/22. During the interim between outpatient wound care follow-up and admission has been managed by [facility]. Per [facility] documentation Profore wraps last changed on 22. Admitted to [facility] on 22 for generalized weakness.   Very poor historian. Profore wraps in place.      Current Treatment Regimen:  Dressing: Dome Aquacel Ag and Compression profore   Frequency:  Twice a day   Changed by:  Home care agency nurse    Review of Systems:  Review of systems not obtained due to patient factors.    Past Medical History:   Diagnosis Date   • Arthritis     knees   • Benign localized hyperplasia of prostate with urinary obstruction  [FreeTextEntry1] : Ms. LAMBERT SIDDIQUI returns to the office for follow-up and her prior history and physical have been reviewed and she reports no change since last visit.  she has been seeing us   For over 2 years now forPost Covid syndrome  including chronic headaches as well as paresthesia.   we have tried her on multiple medications without much relief.  Worker's Comp denied Ajovy  that would send last.  She has also been going to the post Covid  clinic at [place] but more for research studies than clinical management.  She also has been seen several specialists including Hematology, cardiology, but no beneficial treatment has been given.  She continues to have elevated inflammatory markers in the blood work that she does every 6 month,   And she never  got her sense of smell and taste back.  She has been losing weight which she does not mind,   And it was not from topiramate because  she stopped the medication for 4 months completely and continue to lose weight and she did not have  improvement of cognitive impairment as well as paresthesias after stopping topiramate.\par \par She has been taking sumatriptan for abortive therapy, but she  has to go to bed after taking it because how it made her feel.  She has not  tried in combination with NSAIDs .\par  and other lower urinary tract symptoms (LUTS)(600.21) 2009   • Diabetes mellitus (CMS/[facility])    • Essential hypertension, benign 2009   • Non-healing ulcer (CMS/[facility])    • Obesity, unspecified 2009   • Onychomycosis    • Other and unspecified hyperlipidemia 2009   • PAD (peripheral artery disease) (CMS/[facility])    • Shingles 2005    back, buttocks, post thigh     Past Surgical History:   Procedure Laterality Date   • Change of bladder tube,complicated  2019        • Circumcision other > 28 days of age       Social History     Tobacco Use   • Smoking status: Former Smoker     Packs/day: 2.00     Years: 35.00     Pack years: 70.00     Types: Cigarettes     Quit date: 1979     Years since quittin.3   • Smokeless tobacco: Never Used   • Tobacco comment: quit about 30 years ago    Substance Use Topics   • Alcohol use: No     Alcohol/week: 0.0 standard drinks     Family History   Problem Relation Age of Onset   • Hypertension Father    • High blood pressure Father    • Heart disease Brother      Current Facility-Administered Medications   Medication   • acetic acid 0.25 % in sterile water irrigation solution   • acetic acid 0.25 % in sterile water irrigation solution   • silver sulfADIAZINE (THERMAZENE) 1 % cream   • silver sulfADIAZINE (THERMAZENE) 1 % cream   • sodium chloride 0.9 % flush bag 25 mL   • sodium chloride (PF) 0.9 % injection 2 mL   • brimonidine (ALPHAGAN) 0.2 % ophthalmic solution 1 drop   • dorzolamide (TRUSOPT) 2 % ophthalmic solution 1 drop   • hydrALAZINE (APRESOLINE) tablet 75 mg   • latanoprost (XALATAN) 0.005 % ophthalmic solution 1 drop   • cefTRIAXone (ROCEPHIN) syringe 2,000 mg   • sodium chloride 0.9 % flush bag 25 mL   • ondansetron (ZOFRAN) injection 4 mg   • acetaminophen (TYLENOL) tablet 650 mg   • polyethylene glycol (MIRALAX) packet 17 g   • docusate sodium-sennosides (SENOKOT S) 50-8.6 MG 2 tablet   • aluminum-magnesium hydroxide-simethicone (MAALOX)  200-200-20 MG/5ML suspension 30 mL   • pneumococcal 20-valent vaccine (PREVNAR 20) injection 0.5 mL   • amLODIPine (NORVASC) tablet 5 mg   • hydrALAZINE (APRESOLINE) injection 10 mg      ALLERGIES:  Unasyn [ampicillin-sulbactam sodium] and Morphine    OBJECTIVE:  Vital Signs:    Visit Vitals  BP (!) 141/67 (BP Location: RUE - Right upper extremity, Patient Position: Semi-Murillo's)   Pulse 79   Temp 98.6 °F (37 °C) (Oral)   Resp 16   Ht 5' 7\" (1.702 m)   Wt 87.5 kg (192 lb 14.4 oz)   SpO2 96%   BMI 30.21 kg/m²       Physical Exam:   General appearance: Alert, in no distress and cooperative  Head:   normocephalic without obvious abnormality  Mouth:   wearing isolation mask  Pulmonary: normal respiratory effort    Left medial leg with small superficial ulcer.   Right posterior leg with extensive open weeping wound with heavy malodorous bright green drainage. All the dressings were very adherent to the patient.   BLE with excessively dry skin  Chronic edema in fair control  Bilateral feet with dorsal humping  + stemmer sign bilaterally  + hyperpigmentation   + non-pitting edema  + lymphorrhea  + papillomatosis                     Wound Bed Quality: as above      Stacy-wound Quality:   As above    Additional Descriptors: none       Wound Leg Right (Active)   Wound Length (cm) 21.5 cm 04/23/22 0900   Wound Width (cm) 13.5 cm 04/23/22 0900   Wound Depth (cm) 0.1 cm 04/23/22 0900   Wound Surface Area (cm^2) 290.25 cm^2 04/23/22 0900   Wound Volume (cm^3) 29.025 cm^3 04/23/22 0900   Number of days: 1       Wound Leg Left (Active)   Wound Length (cm) 3 cm 04/23/22 0900   Wound Width (cm) 4 cm 04/23/22 0900   Wound Surface Area (cm^2) 12 cm^2 04/23/22 0900   Number of days: 1       Wound Coccyx (Active)   Number of days: 1     PROCEDURE: none    Laboratory results:  No results found for: PAB      Albumin (g/dL)   Date Value   04/23/2022 1.9 (L)   04/22/2022 2.2 (L)   11/22/2021 2.2 (L)      Absolute Lymph (K/mcL)   Date Value    04/14/2017 0.3 (L)   04/12/2017 0.2 (L)   04/11/2017 0.5 (L)     Absolute Lymphocytes (K/mcL)   Date Value   04/23/2022 0.7 (L)   04/22/2022 0.8 (L)   11/22/2021 0.8 (L)      No results found for: ZINC      TSH (mcUnits/mL)   Date Value   08/26/2021 1.731   12/15/2015 1.381    No results found for: T4FREE          No results available in last 24 hours  Lab Results   Component Value Date    GLUCOSE 84 04/23/2022    HGBA1C 4.7 11/07/2021    HGBA1C 4.6 12/19/2016       Lab Results   Component Value Date    WBC 9.1 04/23/2022    CRP 4.6 (H) 11/10/2021    CREATININE 1.24 (H) 04/23/2022    GFRESTIMATE 56 (L) 04/23/2022        Infection/inflammation lab results summary:   Recent Labs   Lab 04/23/22  0755 04/22/22  1332 11/22/21  1219 11/12/21  0522 11/11/21  0433 11/10/21  0447 11/09/21  0513 11/08/21  0542 11/07/21  0855 08/26/21  1059 06/26/21  1542   Absolute Neutrophils 7.4 10.7* 7.0  --   --  3.2 3.5 7.6 16.4* 5.6 6.7   WBC 9.1 12.6* 8.7 8.1 5.2 4.9 5.0 8.3 17.9* 7.6 8.5      Recent Labs   Lab 11/10/21  0447 11/08/21  0925 11/07/21  1830 11/07/21  1011 11/07/21  0857 11/07/21  0855   C-Reactive Protein 4.6*  --   --   --   --   --    Procalcitonin  --   --   --   --   --  0.23*   Lactate, Venous  --  1.6 2.8*  --   --   --    VLA  --   --   --  3.2* 4.7*  --    MRSA PCR  --   --   --   --   --  Not Detected   S Aureus PCR  --   --   --   --   --  Not Detected      Culture results:  Specimen Description (no units)   Date Value   10/28/2019 WOUND, SUPERFICIAL LEG, RIGHT   10/09/2019 URINE, CATHETERIZED, HERNANDEZ   07/01/2019 URINE, CATHETERIZED, BLADDER HERNANDEZ BLADDER   06/10/2019 URINE, CLEAN CATCH/MIDSTREAM     Gram Stain (no units)   Date Value   03/29/2022 No polymorphonuclear cells seen.   03/29/2022 No epithelial cells seen.   03/29/2022 No organisms seen.   11/07/2021 Gram positive cocci in chains. (AA)   11/07/2021 Gram positive cocci in clusters. (AA)     CULTURE WITH GRAM STAIN, AEROBIC (no units)   Date Value    03/29/2022 Rare Pseudomonas aeruginosa (A)   03/29/2022 Rare Staphylococcus coagulase negative (A)   03/29/2022 (A)    Very rare Staphylococcus aureus, methicillin resistant     CULTURE (no units)   Date Value   10/28/2019 MANY PSEUDOMONAS AERUGINOSA (P)   10/28/2019 FEW STAPHYLOCOCCUS AUREUS (P)   10/28/2019 (P)    MODERATE MIXED LEONEL CONSISTING OF: 1 TYPE(S) OF GRAM NEGATIVE ORGANISM(S) 3 TYPE(S) OF GRAM POSITIVE ORGANISM(S)   10/28/2019     THIS SAMPLE HAS BEEN SCREENED FOR STAPHYLOCOCCUS AUREUS, GROUP A STREPTOCOCCUS, VANCOMYCIN RESISTANT ENTEROCOCCUS AND PSEUDOMONAS AERUGINOSA.  IF PRESENT, IDENTIFICATION HAS BEEN PERFORMED AND SUSCEPTIBILITY TESTING  PERFORMED AND REPORTED IF APPLICABLE.     ORGANISM (no units)   Date Value   10/28/2019 PSEUDOMONAS AERUGINOSA   10/28/2019 STAPHYLOCOCCUS AUREUS   10/09/2019 ACINETOBACTER BAUMANNII COMPLEX   07/01/2019 ESCHERICHIA COLI           Diagnostic Assessments Reviewed:     US ANKLE / BRACHIAL 1 OR 2 LEVEL EXTREMITY BILATERAL, US LOWER EXTREMITY ARTERIES DUPLEX BILATERAL 2/24/22  IMPRESSION:   1. Ankle brachial indices could not be obtained as the patient did not tolerate cuff inflation due to wounds.   2. Digital pressures are within normal limits, but waveforms appear abnormal suggesting falsely negative FINA examination.   3. Right leg: Proximal anterior tibial artery stenosis. Possible occult distal SFA narrowing given waveform change.   4. Left leg: Distal superficial femoral artery stenosis. Multifocal tibial disease likely with anterior tibial artery stenosis, posterior tibial artery stenosis or occlusion, and peroneal artery stenosis.  5. Elevated blood pressure (180 mmHg)   LLE:  CFA Waveforms: Multiphasic   PT Waveform: Abnormally slowed velocity could suggest stenosis or occlusion.  Peroneal Waveform: Monophasic intermediate resistance   AT Waveform: Midsegment could be occluded.   RLE:   CFA Waveforms: Multiphasic   PT Waveform: Monophasic intermediate  resistance    Peroneal Waveform: Monophasic intermediate resistance   AT Waveform: Monophasic intermediate resistance   Other: Irregular heartbeat. Waveforms become monophasic intermediate resistance in the distal femoral segment.     Nutritional Assessment:  Prealbumin and/or Albumin reviewed    Wound treatment goals are palliative:  Yes    DIAGNOSES:  Lymphedema BLE  Peripheral arterial disease BLE  Venous insufficiency ulcer, chronic, lower extremity BLE    Venous insufficiency, chronic bilat   Obesity     Medical Decision Makin/23/22 - Long-standing lymphedema of each leg with weeping ulcerations to the RLE. On his 3/29/22 visit his LLE was noted to be healed. Now with open wounds.     Local Wound Care:  Hold Profore compression   Once Profore is resumed recommend calamine or viscopaste layer given his excessive dryness.   He needs a product that will better manage his drainage as well as release from his skin better the alginate was essentially adherent to the wounds after 4 days since his last dressing change   Begin BID Silvadene while inpatient        Vascular:  No recent venous insufficiency or arterial studies, last was 2018 - showed hemodynamically significant L pop stenosis.   He was referred to Dr. Vang (2019), who primarily discussed his lymphedema and did not feel any surgical intervention was necessary; he subsequently saw Dr. Malin (May 2019) as well.     FINA & duplex studies suggest arterial components BLE, but wounds are improving so will not proceed with invasive studies/interventions at this time. Discussed with pt., but level of insight is unclear.     He does not use lymphedema pumps because they give him a \"bad feeling\" like being confined. They have not caused pain.       Edema:  Encourage elevating bilateral lower legs as much as possible to assist with lower leg swelling.    Hold Profore compression while inpatient   Recommend resuming Profore compression with dome  or viscopaste layer. Consider having the wet layer touch the open wounds   Recommend getting lymphedema clinic involved once healed.    Encourage lymphedema pump use 1 hour twice daily as outpatient  Low-salt diet recommended previously.       Infectious Disease:  Attempt to resume Acetic Acid soaks  Dr. Pastor following from ID        Offloading:  Continue to encouraging keeping pressure off of wounds, elevating legs   Offloading as discussed above.       Nutrition:  Patient is not diabetic   Encouraged high protein diet for wound healing      Will continue to follow   Recommend resuming AAH care at discharge  Continue palliative wound care efforts     Plan of Care:  Advanced Wound Care Recommendations: See above.  Percent Wound Closure from consult:  NA  Care plan to augment wound closure: Not applicable.  Palliative wound care     Significant note data copied forward from Dr. Jamey Garcia and reviewed by me as needed in the formulation of this note and plan.    Sonia Rain NP  Inpatient NP pager [phone]

## 2022-12-06 NOTE — HISTORY OF PRESENT ILLNESS
[FreeTextEntry1] : Ms. LAMBERT SIDDIQUI returns to the office for follow-up and her prior history and physical have been reviewed and she reports no change since last visit.  she has been seeing us   For over 2 years now forPost Covid syndrome  including chronic headaches as well as paresthesia.   we have tried her on multiple medications without much relief.  Worker's Comp denied Ajovy  that would send last.  She has also been going to the post Covid  clinic at [place] but more for research studies than clinical management.  She also has been seen several specialists including Hematology, cardiology, but no beneficial treatment has been given.  She continues to have elevated inflammatory markers in the blood work that she does every 6 month,   And she never  got her sense of smell and taste back.  She has been losing weight which she does not mind,   And it was not from topiramate because  she stopped the medication for 4 months completely and continue to lose weight and she did not have  improvement of cognitive impairment as well as paresthesias after stopping topiramate.\par \par She has been taking sumatriptan for abortive therapy, but she  has to go to bed after taking it because how it made her feel.  She has not  tried in combination with NSAIDs .\par

## 2022-12-07 ENCOUNTER — FORM ENCOUNTER (OUTPATIENT)
Age: 42
End: 2022-12-07

## 2022-12-14 ENCOUNTER — RX RENEWAL (OUTPATIENT)
Age: 42
End: 2022-12-14

## 2022-12-14 RX ORDER — TOPIRAMATE 100 MG/1
100 TABLET, FILM COATED ORAL
Qty: 60 | Refills: 2 | Status: ACTIVE | COMMUNITY
Start: 2022-09-15 | End: 1900-01-01

## 2023-02-03 ENCOUNTER — APPOINTMENT (OUTPATIENT)
Dept: NEUROLOGY | Facility: CLINIC | Age: 43
End: 2023-02-03
Payer: OTHER MISCELLANEOUS

## 2023-02-03 VITALS
HEIGHT: 65 IN | BODY MASS INDEX: 26.66 KG/M2 | HEART RATE: 73 BPM | DIASTOLIC BLOOD PRESSURE: 77 MMHG | WEIGHT: 160 LBS | SYSTOLIC BLOOD PRESSURE: 123 MMHG

## 2023-02-03 DIAGNOSIS — G43.009 MIGRAINE W/OUT AURA, NOT INTRACTABLE, W/OUT STATUS MIGRAINOSUS: ICD-10-CM

## 2023-02-03 DIAGNOSIS — U09.9 POST COVID-19 CONDITION, UNSPECIFIED: ICD-10-CM

## 2023-02-03 PROCEDURE — 99213 OFFICE O/P EST LOW 20 MIN: CPT

## 2023-02-03 PROCEDURE — 99072 ADDL SUPL MATRL&STAF TM PHE: CPT

## 2023-02-03 RX ORDER — TOPIRAMATE 200 MG/1
200 TABLET ORAL TWICE DAILY
Qty: 180 | Refills: 1 | Status: ACTIVE | COMMUNITY
Start: 2023-02-03 | End: 1900-01-01

## 2023-02-03 NOTE — PHYSICAL EXAM
[Person] : oriented to person [Place] : oriented to place [Time] : oriented to time [Short Term Intact] : short term memory intact [Remote Intact] : remote memory intact [Registration Intact] : recent registration memory intact [Cranial Nerves Optic (II)] : visual acuity intact bilaterally,  visual fields full to confrontation, pupils equal round and reactive to light [Cranial Nerves Facial (VII)] : face symmetrical [Cranial Nerves Oculomotor (III)] : extraocular motion intact [Motor Tone] : muscle tone was normal in all four extremities [Motor Strength] : muscle strength was normal in all four extremities [Involuntary Movements] : no involuntary movements were seen

## 2023-02-06 PROBLEM — U09.9 POST-COVID SYNDROME: Status: ACTIVE | Noted: 2022-12-06

## 2023-02-06 PROBLEM — G43.009 ATYPICAL MIGRAINE: Status: ACTIVE | Noted: 2022-09-15

## 2023-02-06 NOTE — HISTORY OF PRESENT ILLNESS
[FreeTextEntry1] : Ms. Powell is a 42 year old woman who returns for a follow up. She has not seen a Headache Specialist. She has been coming to us for 2 years. She has no relief. She states that she was getting Headaches 3-4 times a week now she gets 2-3 of them a week. She denies of feeling drowsy. She denies of history of a Kidney stone. She denies of being dehydrated. \par We did increase her Topiramate to 100 mg 4 times a day. She has no relief to date.\par \par \par \par \par \par \par \par \par \par \par \par \par Ms. POWELL returns to the office for Headaches. Worker's Comp denied.\par

## 2023-02-06 NOTE — ASSESSMENT
[FreeTextEntry1] : Follow up / Headaches.\par - She was advised to follow up with a Headache Specialist. \par - increase topiramate to 200mg bid slowly, potential sides explained and patient reported understanding\par \par \par \par \par \par I, Patsy Henderson, Attest that this documentation has been prepared under the direction and in the presence of Provider Kb Martins DO\par \par Thank You for letting me assist in the management of this patient. \par \par Kb Martins DO\neda Board Certified, Neurology\par

## 2023-04-27 ENCOUNTER — RX RENEWAL (OUTPATIENT)
Age: 43
End: 2023-04-27

## 2023-06-15 ENCOUNTER — LABORATORY RESULT (OUTPATIENT)
Age: 43
End: 2023-06-15

## 2023-06-15 ENCOUNTER — OUTPATIENT (OUTPATIENT)
Dept: OUTPATIENT SERVICES | Facility: HOSPITAL | Age: 43
LOS: 1 days | End: 2023-06-15
Payer: MEDICAID

## 2023-06-15 ENCOUNTER — APPOINTMENT (OUTPATIENT)
Dept: HEMATOLOGY ONCOLOGY | Facility: CLINIC | Age: 43
End: 2023-06-15
Payer: MEDICAID

## 2023-06-15 VITALS
HEIGHT: 65 IN | BODY MASS INDEX: 28.66 KG/M2 | DIASTOLIC BLOOD PRESSURE: 64 MMHG | RESPIRATION RATE: 16 BRPM | TEMPERATURE: 97.8 F | SYSTOLIC BLOOD PRESSURE: 114 MMHG | HEART RATE: 75 BPM | WEIGHT: 172 LBS

## 2023-06-15 DIAGNOSIS — D50.9 IRON DEFICIENCY ANEMIA, UNSPECIFIED: ICD-10-CM

## 2023-06-15 DIAGNOSIS — Z86.2 PERSONAL HISTORY OF DISEASES OF THE BLOOD AND BLOOD-FORMING ORGANS AND CERTAIN DISORDERS INVOLVING THE IMMUNE MECHANISM: ICD-10-CM

## 2023-06-15 DIAGNOSIS — Z90.49 ACQUIRED ABSENCE OF OTHER SPECIFIED PARTS OF DIGESTIVE TRACT: Chronic | ICD-10-CM

## 2023-06-15 DIAGNOSIS — Z98.891 HISTORY OF UTERINE SCAR FROM PREVIOUS SURGERY: Chronic | ICD-10-CM

## 2023-06-15 PROCEDURE — 36415 COLL VENOUS BLD VENIPUNCTURE: CPT

## 2023-06-15 PROCEDURE — 82607 VITAMIN B-12: CPT

## 2023-06-15 PROCEDURE — 82728 ASSAY OF FERRITIN: CPT

## 2023-06-15 PROCEDURE — 99213 OFFICE O/P EST LOW 20 MIN: CPT

## 2023-06-15 PROCEDURE — 85027 COMPLETE CBC AUTOMATED: CPT

## 2023-06-15 NOTE — PHYSICAL EXAM
NQ=249 bpm, WPLB=527/76 mmhg, SpO2=95.0 %, Resp=12 B/min, EtCO2=35 mmHg, Apnea=15 Seconds, Richi=10 [Normal] : normoactive bowel sounds, soft and nontender, no hepatosplenomegaly or masses appreciated

## 2023-06-16 DIAGNOSIS — D50.9 IRON DEFICIENCY ANEMIA, UNSPECIFIED: ICD-10-CM

## 2023-06-16 LAB
FERRITIN SERPL-MCNC: 19 NG/ML
HCT VFR BLD CALC: 35.7 %
HGB BLD-MCNC: 12.2 G/DL
MCHC RBC-ENTMCNC: 31.1 PG
MCHC RBC-ENTMCNC: 34.2 G/DL
MCV RBC AUTO: 91.1 FL
PLATELET # BLD AUTO: 223 K/UL
PMV BLD: 8.8 FL
RBC # BLD: 3.92 M/UL
RBC # FLD: 12.7 %
VIT B12 SERPL-MCNC: 854 PG/ML
WBC # FLD AUTO: 8.3 K/UL

## 2023-06-17 PROBLEM — Z86.2 HISTORY OF IRON DEFICIENCY ANEMIA: Status: ACTIVE | Noted: 2018-03-22

## 2023-06-17 NOTE — HISTORY OF PRESENT ILLNESS
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 ferritin : 5. she reports constipation from iron. she feels fatigued and with mild dizziness. no SOB or craving for ice. No hematochezia. last EGD in January 2018 , colonoscopy ( result ?) within 12 months. she denies heavy menses. \par \par \par  [de-identified] : 06/25/2018 : Patient returns for follow up , she feels well , last Hb is normal , Ferritin :41 . She continues with heavy menses. \par \par 10/11/2018 : Patient returns for follow up , she reports occasional dizziness, she has less bleeding with menses. Last ferritin is up to 67 . Hb is normal . EGD was negative for celiac disease. \par \par 2/10/2022 Patient returns after a long hiatus during which she contracted Covid 19 and has post-Covid symptoms including brain fog, fatigue , loss of taste , paresthesias . She had further GI work up including colonoscopy , EGD , capsule endoscopy ? and is on dual therapy for reflux symptoms . \par She follows at [place] post covid monitoring and found recently with iron deficiency . She continues with mild to moderate menstrual bleeding . \par \par 10/6/2022 patient returns after venofer for iron deficiency , she feels well , no hematochezia , denies heavy menses , she is currently on ranitidine only . \par \par 6/15/2023: Patient returns for follow up for iron deficiency anemia. She received Venofer x2 in October and has not had any iron supplementation since then. She feels overall well, no acute complaints. She follows with GI for workup of possible celiac disease- as per patient, her endoscopy and biopsy were negative and she was advised to avoid gluten as much as possible. Still on PPI and H2 blocker

## 2023-06-17 NOTE — ASSESSMENT
[FreeTextEntry1] : 1) History of Iron deficiency anemia- intolerant/refractory to PO iron. Good response to IV iron in the past-\par S/P venofer in 10/2022 \par Gliadin antibody IgG positive, no definite evidence of Celiac disease. Negative GI work up except for GE reflux on therapy. On PPI and H2 Blocker \par 2) Post Covid 19 symptoms, intermittent hoarseness. \par \par Up to date with health screenings: mammogram, PAP\par \par PLAN: \par --CBC today reviewed, Hgb 12.2. Will follow up with ferritin level from today\par --Follow up with GI as scheduled \par  \par Repeat CBC, ferritin in 3 months\par \par RTC in 6 months\par \par Patient was seen and examined and discussed with Dr. Palomo who agrees to the above plan of care.\par

## 2023-06-20 DIAGNOSIS — Z86.2 PERSONAL HISTORY OF DISEASES OF THE BLOOD AND BLOOD-FORMING ORGANS AND CERTAIN DISORDERS INVOLVING THE IMMUNE MECHANISM: ICD-10-CM

## 2023-07-10 ENCOUNTER — OUTPATIENT (OUTPATIENT)
Dept: OUTPATIENT SERVICES | Facility: HOSPITAL | Age: 43
LOS: 1 days | End: 2023-07-10
Payer: MEDICAID

## 2023-07-10 ENCOUNTER — APPOINTMENT (OUTPATIENT)
Dept: INFUSION THERAPY | Facility: CLINIC | Age: 43
End: 2023-07-10

## 2023-07-10 DIAGNOSIS — Z86.2 PERSONAL HISTORY OF DISEASES OF THE BLOOD AND BLOOD-FORMING ORGANS AND CERTAIN DISORDERS INVOLVING THE IMMUNE MECHANISM: ICD-10-CM

## 2023-07-10 DIAGNOSIS — Z90.49 ACQUIRED ABSENCE OF OTHER SPECIFIED PARTS OF DIGESTIVE TRACT: Chronic | ICD-10-CM

## 2023-07-10 DIAGNOSIS — Z98.891 HISTORY OF UTERINE SCAR FROM PREVIOUS SURGERY: Chronic | ICD-10-CM

## 2023-07-10 PROCEDURE — 96367 TX/PROPH/DG ADDL SEQ IV INF: CPT

## 2023-07-10 PROCEDURE — 96365 THER/PROPH/DIAG IV INF INIT: CPT

## 2023-07-10 RX ORDER — FERUMOXYTOL 510 MG/17ML
510 INJECTION INTRAVENOUS ONCE
Refills: 0 | Status: COMPLETED | OUTPATIENT
Start: 2023-07-10 | End: 2023-07-10

## 2023-07-10 RX ORDER — ACETAMINOPHEN 500 MG
650 TABLET ORAL ONCE
Refills: 0 | Status: COMPLETED | OUTPATIENT
Start: 2023-07-10 | End: 2023-07-10

## 2023-07-10 RX ORDER — HYDROCORTISONE 20 MG
100 TABLET ORAL ONCE
Refills: 0 | Status: COMPLETED | OUTPATIENT
Start: 2023-07-10 | End: 2023-07-10

## 2023-07-10 RX ADMIN — FERUMOXYTOL 510 MILLIGRAM(S): 510 INJECTION INTRAVENOUS at 16:35

## 2023-07-10 RX ADMIN — Medication 650 MILLIGRAM(S): at 15:44

## 2023-07-10 RX ADMIN — FERUMOXYTOL 156 MILLIGRAM(S): 510 INJECTION INTRAVENOUS at 16:05

## 2023-07-10 RX ADMIN — Medication 100 MILLIGRAM(S): at 15:44

## 2023-07-10 RX ADMIN — Medication 100 MILLIGRAM(S): at 16:05

## 2023-07-11 DIAGNOSIS — Z86.2 PERSONAL HISTORY OF DISEASES OF THE BLOOD AND BLOOD-FORMING ORGANS AND CERTAIN DISORDERS INVOLVING THE IMMUNE MECHANISM: ICD-10-CM

## 2023-08-01 ENCOUNTER — RX RENEWAL (OUTPATIENT)
Age: 43
End: 2023-08-01

## 2023-08-01 RX ORDER — SUMATRIPTAN 100 MG/1
100 TABLET, FILM COATED ORAL
Qty: 9 | Refills: 0 | Status: ACTIVE | COMMUNITY
Start: 2022-08-25 | End: 1900-01-01

## 2024-05-21 ENCOUNTER — NON-APPOINTMENT (OUTPATIENT)
Age: 44
End: 2024-05-21

## 2024-05-28 ENCOUNTER — APPOINTMENT (OUTPATIENT)
Age: 44
End: 2024-05-28
Payer: COMMERCIAL

## 2024-05-28 ENCOUNTER — LABORATORY RESULT (OUTPATIENT)
Age: 44
End: 2024-05-28

## 2024-05-28 VITALS
WEIGHT: 170 LBS | OXYGEN SATURATION: 98 % | BODY MASS INDEX: 28.32 KG/M2 | DIASTOLIC BLOOD PRESSURE: 74 MMHG | RESPIRATION RATE: 16 BRPM | SYSTOLIC BLOOD PRESSURE: 108 MMHG | HEIGHT: 65 IN | HEART RATE: 71 BPM

## 2024-05-28 LAB
ALBUMIN SERPL ELPH-MCNC: 4.2 G/DL
ALP BLD-CCNC: 48 U/L
ALT SERPL-CCNC: 9 U/L
ANION GAP SERPL CALC-SCNC: 9 MMOL/L
AST SERPL-CCNC: 13 U/L
BILIRUB SERPL-MCNC: 0.4 MG/DL
BUN SERPL-MCNC: 11 MG/DL
CALCIUM SERPL-MCNC: 8.9 MG/DL
CHLORIDE SERPL-SCNC: 110 MMOL/L
CO2 SERPL-SCNC: 20 MMOL/L
CREAT SERPL-MCNC: 0.8 MG/DL
EGFR: 93 ML/MIN/1.73M2
GLUCOSE SERPL-MCNC: 105 MG/DL
HCT VFR BLD CALC: 31.7 %
HGB BLD-MCNC: 10.7 G/DL
IRON SATN MFR SERPL: 7 %
IRON SERPL-MCNC: 23 UG/DL
MCHC RBC-ENTMCNC: 28.8 PG
MCHC RBC-ENTMCNC: 33.8 G/DL
MCV RBC AUTO: 85.2 FL
PLATELET # BLD AUTO: 226 K/UL
PMV BLD: 8.4 FL
POTASSIUM SERPL-SCNC: 4.4 MMOL/L
PROT SERPL-MCNC: 7.1 G/DL
RBC # BLD: 3.72 M/UL
RBC # FLD: 13.8 %
SODIUM SERPL-SCNC: 139 MMOL/L
TIBC SERPL-MCNC: 347 UG/DL
UIBC SERPL-MCNC: 324 UG/DL
WBC # FLD AUTO: 4.75 K/UL

## 2024-05-28 PROCEDURE — 99213 OFFICE O/P EST LOW 20 MIN: CPT

## 2024-05-28 NOTE — HISTORY OF PRESENT ILLNESS
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 ferritin : 5. she reports constipation from iron. she feels fatigued and with mild dizziness. no SOB or craving for ice. No hematochezia. last EGD in January 2018 , colonoscopy ( result ?) within 12 months. she denies heavy menses. \par  \par  \par   [de-identified] : 06/25/2018 : Patient returns for follow up , she feels well , last Hb is normal , Ferritin :41 . She continues with heavy menses.   10/11/2018 : Patient returns for follow up , she reports occasional dizziness, she has less bleeding with menses. Last ferritin is up to 67 . Hb is normal . EGD was negative for celiac disease.   2/10/2022 Patient returns after a long hiatus during which she contracted Covid 19 and has post-Covid symptoms including brain fog, fatigue , loss of taste , paresthesias . She had further GI work up including colonoscopy , EGD , capsule endoscopy ? and is on dual therapy for reflux symptoms .  She follows at [place] post covid monitoring and found recently with iron deficiency . She continues with mild to moderate menstrual bleeding .   10/6/2022 patient returns after venofer for iron deficiency , she feels well , no hematochezia , denies heavy menses , she is currently on ranitidine only .   6/15/2023: Patient returns for follow up for iron deficiency anemia. She received Venofer x2 in October and has not had any iron supplementation since then. She feels overall well, no acute complaints. She follows with GI for workup of possible celiac disease- as per patient, her endoscopy and biopsy were negative and she was advised to avoid gluten as much as possible. Still on PPI and H2 blocker   5/28/2024: Patient returns for follow up after one year, she feels overall well. No new complaints at this time. She reports 5 pound weight gain despite normal eating and exercise habits. She She does not take PO iron and has not had IV iron in the past year. She continues on gluten free diet and follows closely with GI. Periods are regular and not heavy.

## 2024-05-28 NOTE — ASSESSMENT
[FreeTextEntry1] : 1) History of Iron deficiency anemia- intolerant/refractory to PO iron. Good response to IV iron in the past- S/P venofer in June 2023 Gliadin antibody IgG positive, no definite evidence of Celiac disease. Negative GI work up except for GE reflux on therapy. On PPI and H2 Blocker  2) Post Covid 19 symptoms, intermittent hoarseness.   Up to date with health screenings: mammogram, PAP Due for EGD and colonoscopy this summer  PLAN:  --Outside labs from April reviewed, Hgb 10.8 and ferritin 9 --Will obtain updated labs today as well as recheck B12 and MMA. Will likely schedule for IV iron  --Follow up with GI as scheduled    Repeat CBC, ferritin in 3 months  RTC in 6 months

## 2024-05-30 LAB
FERRITIN SERPL-MCNC: 6 NG/ML
VIT B12 SERPL-MCNC: 594 PG/ML

## 2024-06-03 ENCOUNTER — APPOINTMENT (OUTPATIENT)
Age: 44
End: 2024-06-03

## 2024-06-03 ENCOUNTER — OUTPATIENT (OUTPATIENT)
Dept: OUTPATIENT SERVICES | Facility: HOSPITAL | Age: 44
LOS: 1 days | End: 2024-06-03
Payer: COMMERCIAL

## 2024-06-03 VITALS — TEMPERATURE: 98 F | SYSTOLIC BLOOD PRESSURE: 102 MMHG | HEART RATE: 60 BPM | DIASTOLIC BLOOD PRESSURE: 62 MMHG

## 2024-06-03 DIAGNOSIS — Z98.891 HISTORY OF UTERINE SCAR FROM PREVIOUS SURGERY: Chronic | ICD-10-CM

## 2024-06-03 DIAGNOSIS — Z86.2 PERSONAL HISTORY OF DISEASES OF THE BLOOD AND BLOOD-FORMING ORGANS AND CERTAIN DISORDERS INVOLVING THE IMMUNE MECHANISM: ICD-10-CM

## 2024-06-03 DIAGNOSIS — Z90.49 ACQUIRED ABSENCE OF OTHER SPECIFIED PARTS OF DIGESTIVE TRACT: Chronic | ICD-10-CM

## 2024-06-03 PROCEDURE — 96365 THER/PROPH/DIAG IV INF INIT: CPT

## 2024-06-03 RX ORDER — ACETAMINOPHEN 325 MG/1
650 TABLET ORAL ONCE
Refills: 0 | Status: COMPLETED | OUTPATIENT
Start: 2024-06-03 | End: 2024-06-03

## 2024-06-03 RX ORDER — FERUMOXYTOL 510 MG/17ML
510 INJECTION INTRAVENOUS ONCE
Refills: 0 | Status: COMPLETED | OUTPATIENT
Start: 2024-06-03 | End: 2024-06-03

## 2024-06-03 RX ADMIN — ACETAMINOPHEN 650 MILLIGRAM(S): 325 TABLET ORAL at 16:52

## 2024-06-03 RX ADMIN — FERUMOXYTOL 156 MILLIGRAM(S): 510 INJECTION INTRAVENOUS at 16:53

## 2024-06-03 RX ADMIN — ACETAMINOPHEN 650 MILLIGRAM(S): 325 TABLET ORAL at 16:54

## 2024-06-03 RX ADMIN — FERUMOXYTOL 510 MILLIGRAM(S): 510 INJECTION INTRAVENOUS at 17:40

## 2024-06-04 DIAGNOSIS — Z86.2 PERSONAL HISTORY OF DISEASES OF THE BLOOD AND BLOOD-FORMING ORGANS AND CERTAIN DISORDERS INVOLVING THE IMMUNE MECHANISM: ICD-10-CM

## 2024-06-10 LAB — METHYLMALONATE SERPL-SCNC: 160 NMOL/L

## 2024-06-11 ENCOUNTER — OUTPATIENT (OUTPATIENT)
Dept: OUTPATIENT SERVICES | Facility: HOSPITAL | Age: 44
LOS: 1 days | End: 2024-06-11
Payer: COMMERCIAL

## 2024-06-11 ENCOUNTER — APPOINTMENT (OUTPATIENT)
Age: 44
End: 2024-06-11

## 2024-06-11 VITALS — TEMPERATURE: 98 F | HEART RATE: 67 BPM | SYSTOLIC BLOOD PRESSURE: 102 MMHG | DIASTOLIC BLOOD PRESSURE: 67 MMHG

## 2024-06-11 DIAGNOSIS — Z98.891 HISTORY OF UTERINE SCAR FROM PREVIOUS SURGERY: Chronic | ICD-10-CM

## 2024-06-11 DIAGNOSIS — Z86.2 PERSONAL HISTORY OF DISEASES OF THE BLOOD AND BLOOD-FORMING ORGANS AND CERTAIN DISORDERS INVOLVING THE IMMUNE MECHANISM: ICD-10-CM

## 2024-06-11 DIAGNOSIS — Z90.49 ACQUIRED ABSENCE OF OTHER SPECIFIED PARTS OF DIGESTIVE TRACT: Chronic | ICD-10-CM

## 2024-06-11 PROCEDURE — 96365 THER/PROPH/DIAG IV INF INIT: CPT

## 2024-06-11 RX ORDER — ACETAMINOPHEN 325 MG/1
650 TABLET ORAL ONCE
Refills: 0 | Status: COMPLETED | OUTPATIENT
Start: 2024-06-11 | End: 2024-06-11

## 2024-06-11 RX ORDER — FERUMOXYTOL 510 MG/17ML
510 INJECTION INTRAVENOUS ONCE
Refills: 0 | Status: COMPLETED | OUTPATIENT
Start: 2024-06-11 | End: 2024-06-11

## 2024-06-11 RX ADMIN — ACETAMINOPHEN 650 MILLIGRAM(S): 325 TABLET ORAL at 16:35

## 2024-06-11 RX ADMIN — FERUMOXYTOL 510 MILLIGRAM(S): 510 INJECTION INTRAVENOUS at 17:30

## 2024-06-11 RX ADMIN — FERUMOXYTOL 156 MILLIGRAM(S): 510 INJECTION INTRAVENOUS at 16:45

## 2024-08-27 ENCOUNTER — APPOINTMENT (OUTPATIENT)
Age: 44
End: 2024-08-27

## 2024-09-12 ENCOUNTER — APPOINTMENT (OUTPATIENT)
Dept: OBGYN | Facility: CLINIC | Age: 44
End: 2024-09-12

## 2024-09-16 ENCOUNTER — APPOINTMENT (OUTPATIENT)
Dept: OBGYN | Facility: CLINIC | Age: 44
End: 2024-09-16
Payer: COMMERCIAL

## 2024-09-16 ENCOUNTER — LABORATORY RESULT (OUTPATIENT)
Age: 44
End: 2024-09-16

## 2024-09-16 VITALS
WEIGHT: 160 LBS | SYSTOLIC BLOOD PRESSURE: 100 MMHG | DIASTOLIC BLOOD PRESSURE: 65 MMHG | BODY MASS INDEX: 26.66 KG/M2 | HEART RATE: 60 BPM | HEIGHT: 65 IN

## 2024-09-16 DIAGNOSIS — Z01.419 ENCOUNTER FOR GYNECOLOGICAL EXAMINATION (GENERAL) (ROUTINE) W/OUT ABNORMAL FINDINGS: ICD-10-CM

## 2024-09-16 DIAGNOSIS — N83.292 OTHER OVARIAN CYST, LEFT SIDE: ICD-10-CM

## 2024-09-16 PROCEDURE — 99459 PELVIC EXAMINATION: CPT

## 2024-09-16 PROCEDURE — 99396 PREV VISIT EST AGE 40-64: CPT | Mod: 25

## 2024-09-16 PROCEDURE — 76830 TRANSVAGINAL US NON-OB: CPT

## 2024-09-16 NOTE — HISTORY OF PRESENT ILLNESS
[FreeTextEntry1] : new pt \par  annual visit \par  no complaints [N] : Patient reports normal menses [Withdrawal] : uses withdrawal [Monogamous (Male Partner)] : is monogamous with a male partner [Y] : Positive pregnancy history [LMPDate] : 9/16/24 [MensesFreq] : 28 [MensesLength] : 5 [MensesAmount] : mod-heavy [de-identified] :  [PGHxTotal] : 2 [[facility] : 2 [Regular Cycle Intervals] : periods have been regular [Currently Active] : currently active [Men] : men [Vaginal] : vaginal [No] : No

## 2024-09-16 NOTE — PHYSICAL EXAM
[Chaperone Present] : A chaperone was present in the examining room during all aspects of the physical examination [10057] : A chaperone was present during the pelvic exam. [FreeTextEntry2] : SH [Appropriately responsive] : appropriately responsive [Alert] : alert [No Acute Distress] : no acute distress [Soft] : soft [Non-tender] : non-tender [Non-distended] : non-distended [Oriented x3] : oriented x3 [Examination Of The Breasts] : a normal appearance [No Discharge] : no discharge [No Masses] : no breast masses were palpable [Labia Majora] : normal [Labia Minora] : normal [Normal] : normal [Retroversion] : retroverted [Uterine Adnexae] : normal

## 2024-09-16 NOTE — PROCEDURE
[Cervical Pap Smear] : cervical Pap smear [Liquid Base] : liquid base [GC & Chlamydia via Pap] : GC & Chlamydia via Pap [Tolerated Well] : the patient tolerated the procedure well [No Complications] : there were no complications [Suspected Ovarian Cyst] : suspected ovarian cyst [Transvaginal Ultrasound] : transvaginal ultrasound [Retroverted] : retroverted [L: ___ cm] : L: [unfilled] cm [W: ___cm] : W: [unfilled] cm [H: ___ cm] : H: [unfilled] cm [FreeTextEntry7] : 3.30cc [FreeTextEntry8] : 6.19cc unilocular cyst

## 2024-09-16 NOTE — DISCUSSION/SUMMARY
[FreeTextEntry1] : pt not taking any medications for her migraines at this time pt will  mammo req at her 6 mth f/u visit

## 2024-11-13 ENCOUNTER — TRANSCRIPTION ENCOUNTER (OUTPATIENT)
Age: 44
End: 2024-11-13

## 2024-11-19 ENCOUNTER — APPOINTMENT (OUTPATIENT)
Age: 44
End: 2024-11-19
Payer: COMMERCIAL

## 2024-11-19 ENCOUNTER — OUTPATIENT (OUTPATIENT)
Dept: OUTPATIENT SERVICES | Facility: HOSPITAL | Age: 44
LOS: 1 days | End: 2024-11-19
Payer: COMMERCIAL

## 2024-11-19 ENCOUNTER — LABORATORY RESULT (OUTPATIENT)
Age: 44
End: 2024-11-19

## 2024-11-19 VITALS
WEIGHT: 158.44 LBS | OXYGEN SATURATION: 98 % | HEIGHT: 63.5 IN | HEART RATE: 74 BPM | DIASTOLIC BLOOD PRESSURE: 64 MMHG | RESPIRATION RATE: 18 BRPM | TEMPERATURE: 98.4 F | SYSTOLIC BLOOD PRESSURE: 121 MMHG | BODY MASS INDEX: 27.73 KG/M2

## 2024-11-19 DIAGNOSIS — Z86.2 PERSONAL HISTORY OF DISEASES OF THE BLOOD AND BLOOD-FORMING ORGANS AND CERTAIN DISORDERS INVOLVING THE IMMUNE MECHANISM: ICD-10-CM

## 2024-11-19 DIAGNOSIS — D64.9 ANEMIA, UNSPECIFIED: ICD-10-CM

## 2024-11-19 DIAGNOSIS — Z98.891 HISTORY OF UTERINE SCAR FROM PREVIOUS SURGERY: Chronic | ICD-10-CM

## 2024-11-19 DIAGNOSIS — Z90.49 ACQUIRED ABSENCE OF OTHER SPECIFIED PARTS OF DIGESTIVE TRACT: Chronic | ICD-10-CM

## 2024-11-19 LAB
HCT VFR BLD CALC: 33.9 %
HGB BLD-MCNC: 11.6 G/DL
MCHC RBC-ENTMCNC: 32.1 PG
MCHC RBC-ENTMCNC: 34.2 G/DL
MCV RBC AUTO: 93.9 FL
PLATELET # BLD AUTO: 244 K/UL
PMV BLD: 8.5 FL
RBC # BLD: 3.61 M/UL
RBC # FLD: 12.8 %
WBC # FLD AUTO: 6.26 K/UL

## 2024-11-19 PROCEDURE — 99213 OFFICE O/P EST LOW 20 MIN: CPT

## 2024-11-19 PROCEDURE — 85027 COMPLETE CBC AUTOMATED: CPT

## 2024-11-20 DIAGNOSIS — D64.9 ANEMIA, UNSPECIFIED: ICD-10-CM

## 2024-12-04 ENCOUNTER — TRANSCRIPTION ENCOUNTER (OUTPATIENT)
Age: 44
End: 2024-12-04

## 2025-02-24 ENCOUNTER — OUTPATIENT (OUTPATIENT)
Dept: OUTPATIENT SERVICES | Facility: HOSPITAL | Age: 45
LOS: 1 days | End: 2025-02-24
Payer: COMMERCIAL

## 2025-02-24 ENCOUNTER — APPOINTMENT (OUTPATIENT)
Age: 45
End: 2025-02-24

## 2025-02-24 DIAGNOSIS — Z90.49 ACQUIRED ABSENCE OF OTHER SPECIFIED PARTS OF DIGESTIVE TRACT: Chronic | ICD-10-CM

## 2025-02-24 DIAGNOSIS — Z98.891 HISTORY OF UTERINE SCAR FROM PREVIOUS SURGERY: Chronic | ICD-10-CM

## 2025-02-24 DIAGNOSIS — D64.9 ANEMIA, UNSPECIFIED: ICD-10-CM

## 2025-02-24 LAB
AUTO BASOPHILS #: 0.04 K/UL
AUTO BASOPHILS %: 0.5 %
AUTO EOSINOPHILS #: 0.22 K/UL
AUTO EOSINOPHILS %: 2.8 %
AUTO IMMATURE GRANULOCYTES #: 0.06 K/UL
AUTO LYMPHOCYTES #: 3.86 K/UL
AUTO LYMPHOCYTES %: 48.3 %
AUTO MONOCYTES #: 0.87 K/UL
AUTO MONOCYTES %: 10.9 %
AUTO NEUTROPHILS #: 2.95 K/UL
AUTO NEUTROPHILS %: 36.7 %
AUTO NRBC #: 0 K/UL
HCT VFR BLD CALC: 35.4 %
HGB BLD-MCNC: 12.2 G/DL
IMM GRANULOCYTES NFR BLD AUTO: 0.8 %
IRON SATN MFR SERPL: 13 %
IRON SERPL-MCNC: 40 UG/DL
MAN DIFF?: NORMAL
MCHC RBC-ENTMCNC: 30 PG
MCHC RBC-ENTMCNC: 34.5 G/DL
MCV RBC AUTO: 87 FL
PLATELET # BLD AUTO: 279 K/UL
PMV BLD AUTO: 0 /100 WBCS
PMV BLD: 8.6 FL
RBC # BLD: 4.07 M/UL
RBC # FLD: 13.1 %
TIBC SERPL-MCNC: 308 UG/DL
UIBC SERPL-MCNC: 268 UG/DL
WBC # FLD AUTO: 8 K/UL

## 2025-02-24 PROCEDURE — 85025 COMPLETE CBC W/AUTO DIFF WBC: CPT

## 2025-02-24 PROCEDURE — 36415 COLL VENOUS BLD VENIPUNCTURE: CPT

## 2025-02-24 PROCEDURE — 82728 ASSAY OF FERRITIN: CPT

## 2025-02-24 PROCEDURE — 83550 IRON BINDING TEST: CPT

## 2025-02-24 PROCEDURE — 83540 ASSAY OF IRON: CPT

## 2025-02-25 DIAGNOSIS — D64.9 ANEMIA, UNSPECIFIED: ICD-10-CM

## 2025-02-25 LAB — FERRITIN SERPL-MCNC: 19 NG/ML

## 2025-03-17 ENCOUNTER — APPOINTMENT (OUTPATIENT)
Dept: OBGYN | Facility: CLINIC | Age: 45
End: 2025-03-17

## 2025-05-13 ENCOUNTER — APPOINTMENT (OUTPATIENT)
Age: 45
End: 2025-05-13

## 2025-06-03 ENCOUNTER — APPOINTMENT (OUTPATIENT)
Age: 45
End: 2025-06-03

## 2025-06-12 ENCOUNTER — OUTPATIENT (OUTPATIENT)
Dept: OUTPATIENT SERVICES | Facility: HOSPITAL | Age: 45
LOS: 1 days | End: 2025-06-12
Payer: MEDICAID

## 2025-06-12 ENCOUNTER — APPOINTMENT (OUTPATIENT)
Age: 45
End: 2025-06-12
Payer: COMMERCIAL

## 2025-06-12 VITALS
TEMPERATURE: 98.5 F | WEIGHT: 166 LBS | BODY MASS INDEX: 29.05 KG/M2 | HEIGHT: 63.5 IN | SYSTOLIC BLOOD PRESSURE: 111 MMHG | HEART RATE: 79 BPM | RESPIRATION RATE: 18 BRPM | OXYGEN SATURATION: 99 % | DIASTOLIC BLOOD PRESSURE: 75 MMHG

## 2025-06-12 DIAGNOSIS — D64.9 ANEMIA, UNSPECIFIED: ICD-10-CM

## 2025-06-12 DIAGNOSIS — Z90.49 ACQUIRED ABSENCE OF OTHER SPECIFIED PARTS OF DIGESTIVE TRACT: Chronic | ICD-10-CM

## 2025-06-12 DIAGNOSIS — Z98.891 HISTORY OF UTERINE SCAR FROM PREVIOUS SURGERY: Chronic | ICD-10-CM

## 2025-06-12 LAB
AUTO BASOPHILS #: 0.06 K/UL
AUTO BASOPHILS %: 0.8 %
AUTO EOSINOPHILS #: 0.14 K/UL
AUTO EOSINOPHILS %: 2 %
AUTO IMMATURE GRANULOCYTES #: 0.02 K/UL
AUTO LYMPHOCYTES #: 3.13 K/UL
AUTO LYMPHOCYTES %: 43.9 %
AUTO MONOCYTES #: 0.81 K/UL
AUTO MONOCYTES %: 11.4 %
AUTO NEUTROPHILS #: 2.97 K/UL
AUTO NEUTROPHILS %: 41.6 %
AUTO NRBC #: 0 K/UL
HCT VFR BLD CALC: 30.9 %
HGB BLD-MCNC: 10.3 G/DL
IMM GRANULOCYTES NFR BLD AUTO: 0.3 %
MAN DIFF?: NORMAL
MCHC RBC-ENTMCNC: 28 PG
MCHC RBC-ENTMCNC: 33.3 G/DL
MCV RBC AUTO: 84 FL
PLATELET # BLD AUTO: 229 K/UL
PMV BLD AUTO: 0 /100 WBCS
PMV BLD: 8.3 FL
RBC # BLD: 3.68 M/UL
RBC # FLD: 14 %
WBC # FLD AUTO: 7.13 K/UL

## 2025-06-12 PROCEDURE — 85025 COMPLETE CBC W/AUTO DIFF WBC: CPT

## 2025-06-12 PROCEDURE — 99214 OFFICE O/P EST MOD 30 MIN: CPT

## 2025-06-12 PROCEDURE — 82728 ASSAY OF FERRITIN: CPT

## 2025-06-13 DIAGNOSIS — D64.9 ANEMIA, UNSPECIFIED: ICD-10-CM

## 2025-06-13 LAB — FERRITIN SERPL-MCNC: 7 NG/ML

## 2025-06-19 ENCOUNTER — OUTPATIENT (OUTPATIENT)
Dept: OUTPATIENT SERVICES | Facility: HOSPITAL | Age: 45
LOS: 1 days | End: 2025-06-19
Payer: MEDICAID

## 2025-06-19 ENCOUNTER — APPOINTMENT (OUTPATIENT)
Age: 45
End: 2025-06-19

## 2025-06-19 VITALS — HEART RATE: 63 BPM | DIASTOLIC BLOOD PRESSURE: 70 MMHG | TEMPERATURE: 98 F | SYSTOLIC BLOOD PRESSURE: 103 MMHG

## 2025-06-19 DIAGNOSIS — Z90.49 ACQUIRED ABSENCE OF OTHER SPECIFIED PARTS OF DIGESTIVE TRACT: Chronic | ICD-10-CM

## 2025-06-19 DIAGNOSIS — D64.9 ANEMIA, UNSPECIFIED: ICD-10-CM

## 2025-06-19 DIAGNOSIS — Z98.891 HISTORY OF UTERINE SCAR FROM PREVIOUS SURGERY: Chronic | ICD-10-CM

## 2025-06-19 PROCEDURE — 96365 THER/PROPH/DIAG IV INF INIT: CPT

## 2025-06-19 RX ORDER — ACETAMINOPHEN 500 MG/5ML
650 LIQUID (ML) ORAL ONCE
Refills: 0 | Status: COMPLETED | OUTPATIENT
Start: 2025-06-19 | End: 2025-06-19

## 2025-06-19 RX ORDER — FERUMOXYTOL 510 MG/17ML
510 INJECTION INTRAVENOUS ONCE
Refills: 0 | Status: COMPLETED | OUTPATIENT
Start: 2025-06-19 | End: 2025-06-19

## 2025-06-19 RX ADMIN — Medication 650 MILLIGRAM(S): at 18:10

## 2025-06-19 RX ADMIN — FERUMOXYTOL 156 MILLIGRAM(S): 510 INJECTION INTRAVENOUS at 18:10

## 2025-06-19 RX ADMIN — FERUMOXYTOL 510 MILLIGRAM(S): 510 INJECTION INTRAVENOUS at 19:00

## 2025-06-28 ENCOUNTER — APPOINTMENT (OUTPATIENT)
Age: 45
End: 2025-06-28

## 2025-08-05 ENCOUNTER — APPOINTMENT (OUTPATIENT)
Dept: RHEUMATOLOGY | Facility: CLINIC | Age: 45
End: 2025-08-05
Payer: MEDICAID

## 2025-08-05 VITALS
HEIGHT: 64 IN | OXYGEN SATURATION: 98 % | WEIGHT: 164 LBS | DIASTOLIC BLOOD PRESSURE: 83 MMHG | HEART RATE: 70 BPM | BODY MASS INDEX: 28 KG/M2 | SYSTOLIC BLOOD PRESSURE: 116 MMHG

## 2025-08-05 DIAGNOSIS — R76.8 OTHER SPECIFIED ABNORMAL IMMUNOLOGICAL FINDINGS IN SERUM: ICD-10-CM

## 2025-08-05 PROCEDURE — 99204 OFFICE O/P NEW MOD 45 MIN: CPT

## 2025-08-05 RX ORDER — CHLORHEXIDINE GLUCONATE 4 %
1000 LIQUID (ML) TOPICAL
Refills: 0 | Status: ACTIVE | COMMUNITY

## 2025-08-08 LAB
C3 SERPL-MCNC: 116 MG/DL
C4 SERPL-MCNC: 23 MG/DL
CCP AB SER IA-ACNC: <8 U/ML
CCP AB SER QL: NEGATIVE
CENTROMERE B AB SER-ACNC: <0.2 AL
CK SERPL-CCNC: 57 U/L
ENA RNP AB SER-ACNC: <0.2 AL
ENA SCL70 AB SER-ACNC: <0.2 AL
ENA SM AB SER-ACNC: <0.2 AL
ENA SS-A AB SER-ACNC: 1.1 AL
ENA SS-B AB SER-ACNC: <0.2 AL
HBV CORE IGG+IGM SER QL: NONREACTIVE
HBV SURFACE AB SER QL: REACTIVE
HBV SURFACE AG SER QL: NONREACTIVE
HCV AB SER QL: NONREACTIVE
HCV S/CO RATIO: 0.05 COI
RNA POLYMERASE III IGG: 78 UNITS

## 2025-08-13 ENCOUNTER — APPOINTMENT (OUTPATIENT)
Dept: RHEUMATOLOGY | Facility: CLINIC | Age: 45
End: 2025-08-13
Payer: MEDICAID

## 2025-08-13 VITALS
WEIGHT: 164 LBS | TEMPERATURE: 98.3 F | DIASTOLIC BLOOD PRESSURE: 74 MMHG | HEART RATE: 75 BPM | HEIGHT: 64 IN | OXYGEN SATURATION: 98 % | BODY MASS INDEX: 28 KG/M2 | SYSTOLIC BLOOD PRESSURE: 115 MMHG

## 2025-08-13 DIAGNOSIS — J84.9 INTERSTITIAL PULMONARY DISEASE, UNSPECIFIED: ICD-10-CM

## 2025-08-13 DIAGNOSIS — M34.9 SYSTEMIC SCLEROSIS, UNSPECIFIED: ICD-10-CM

## 2025-08-13 PROCEDURE — 99214 OFFICE O/P EST MOD 30 MIN: CPT

## 2025-08-23 ENCOUNTER — OUTPATIENT (OUTPATIENT)
Dept: OUTPATIENT SERVICES | Facility: HOSPITAL | Age: 45
LOS: 1 days | End: 2025-08-23
Payer: MEDICAID

## 2025-08-23 ENCOUNTER — RESULT REVIEW (OUTPATIENT)
Age: 45
End: 2025-08-23

## 2025-08-23 DIAGNOSIS — Z90.49 ACQUIRED ABSENCE OF OTHER SPECIFIED PARTS OF DIGESTIVE TRACT: Chronic | ICD-10-CM

## 2025-08-23 DIAGNOSIS — Z98.891 HISTORY OF UTERINE SCAR FROM PREVIOUS SURGERY: Chronic | ICD-10-CM

## 2025-08-23 PROCEDURE — 71250 CT THORAX DX C-: CPT | Mod: 26

## 2025-08-23 PROCEDURE — 71250 CT THORAX DX C-: CPT

## 2025-08-24 DIAGNOSIS — J84.9 INTERSTITIAL PULMONARY DISEASE, UNSPECIFIED: ICD-10-CM

## 2025-09-15 ENCOUNTER — APPOINTMENT (OUTPATIENT)
Age: 45
End: 2025-09-15
Payer: MEDICAID

## 2025-09-15 VITALS
WEIGHT: 168 LBS | OXYGEN SATURATION: 100 % | DIASTOLIC BLOOD PRESSURE: 75 MMHG | SYSTOLIC BLOOD PRESSURE: 111 MMHG | TEMPERATURE: 98.3 F | HEART RATE: 74 BPM | HEIGHT: 64 IN | BODY MASS INDEX: 28.68 KG/M2

## 2025-09-15 DIAGNOSIS — Z86.2 PERSONAL HISTORY OF DISEASES OF THE BLOOD AND BLOOD-FORMING ORGANS AND CERTAIN DISORDERS INVOLVING THE IMMUNE MECHANISM: ICD-10-CM

## 2025-09-15 LAB
AUTO BASOPHILS #: 0.06 K/UL
AUTO BASOPHILS %: 0.8 %
AUTO EOSINOPHILS #: 0.15 K/UL
AUTO EOSINOPHILS %: 2 %
AUTO IMMATURE GRANULOCYTES #: 0.01 K/UL
AUTO LYMPHOCYTES #: 2.63 K/UL
AUTO LYMPHOCYTES %: 35.9 %
AUTO MONOCYTES #: 0.73 K/UL
AUTO MONOCYTES %: 10 %
AUTO NEUTROPHILS #: 3.74 K/UL
AUTO NEUTROPHILS %: 51.2 %
AUTO NRBC #: 0 K/UL
HCT VFR BLD CALC: 35.2 %
HGB BLD-MCNC: 12.2 G/DL
IMM GRANULOCYTES NFR BLD AUTO: 0.1 %
MAN DIFF?: NORMAL
MCHC RBC-ENTMCNC: 30.9 PG
MCHC RBC-ENTMCNC: 34.7 G/DL
MCV RBC AUTO: 89.1 FL
PLATELET # BLD AUTO: 193 K/UL
PMV BLD AUTO: 0 /100 WBCS
PMV BLD: 8.8 FL
RBC # BLD: 3.95 M/UL
RBC # FLD: 13.9 %
WBC # FLD AUTO: 7.32 K/UL

## 2025-09-15 PROCEDURE — 99214 OFFICE O/P EST MOD 30 MIN: CPT

## 2025-09-16 LAB — FERRITIN SERPL-MCNC: 54 NG/ML

## 2025-09-22 PROBLEM — Z86.69 HISTORY OF MIGRAINE HEADACHES: Status: RESOLVED | Noted: 2025-09-22 | Resolved: 2025-09-22

## 2025-09-22 PROBLEM — R31.29 HEMATURIA, MICROSCOPIC: Status: ACTIVE | Noted: 2025-09-22
